# Patient Record
Sex: FEMALE | Race: WHITE | URBAN - METROPOLITAN AREA
[De-identification: names, ages, dates, MRNs, and addresses within clinical notes are randomized per-mention and may not be internally consistent; named-entity substitution may affect disease eponyms.]

---

## 2022-10-20 ENCOUNTER — APPOINTMENT (OUTPATIENT)
Dept: CT IMAGING | Age: 52
DRG: 263 | End: 2022-10-20
Payer: COMMERCIAL

## 2022-10-20 ENCOUNTER — APPOINTMENT (OUTPATIENT)
Dept: GENERAL RADIOLOGY | Age: 52
DRG: 263 | End: 2022-10-20
Payer: COMMERCIAL

## 2022-10-20 ENCOUNTER — HOSPITAL ENCOUNTER (INPATIENT)
Age: 52
LOS: 2 days | Discharge: HOME | DRG: 263 | End: 2022-10-23
Attending: INTERNAL MEDICINE | Admitting: INTERNAL MEDICINE
Payer: COMMERCIAL

## 2022-10-20 DIAGNOSIS — R74.8 ELEVATED LIPASE: ICD-10-CM

## 2022-10-20 DIAGNOSIS — K85.10 ACUTE GALLSTONE PANCREATITIS: ICD-10-CM

## 2022-10-20 DIAGNOSIS — K80.20 SYMPTOMATIC CHOLELITHIASIS: Primary | ICD-10-CM

## 2022-10-20 DIAGNOSIS — K85.10 GALLSTONE PANCREATITIS: ICD-10-CM

## 2022-10-20 LAB
A/G RATIO: 1.8 (ref 1.1–2.2)
ALBUMIN SERPL-MCNC: 3.9 G/DL (ref 3.4–5)
ALP BLD-CCNC: 57 U/L (ref 40–129)
ALT SERPL-CCNC: 16 U/L (ref 10–40)
ANION GAP SERPL CALCULATED.3IONS-SCNC: 9 MMOL/L (ref 3–16)
AST SERPL-CCNC: 17 U/L (ref 15–37)
BACTERIA: ABNORMAL /HPF
BASOPHILS ABSOLUTE: 0.1 K/UL (ref 0–0.2)
BASOPHILS RELATIVE PERCENT: 0.7 %
BILIRUB SERPL-MCNC: 0.3 MG/DL (ref 0–1)
BILIRUBIN URINE: NEGATIVE
BLOOD, URINE: NEGATIVE
BUN BLDV-MCNC: 9 MG/DL (ref 7–20)
CALCIUM SERPL-MCNC: 9.4 MG/DL (ref 8.3–10.6)
CHLORIDE BLD-SCNC: 103 MMOL/L (ref 99–110)
CLARITY: CLEAR
CO2: 27 MMOL/L (ref 21–32)
COLOR: YELLOW
CREAT SERPL-MCNC: 0.7 MG/DL (ref 0.6–1.1)
EOSINOPHILS ABSOLUTE: 0.3 K/UL (ref 0–0.6)
EOSINOPHILS RELATIVE PERCENT: 3.4 %
EPITHELIAL CELLS, UA: 1 /HPF (ref 0–5)
GFR SERPL CREATININE-BSD FRML MDRD: >60 ML/MIN/{1.73_M2}
GLUCOSE BLD-MCNC: 102 MG/DL (ref 70–99)
GLUCOSE URINE: NEGATIVE MG/DL
HCT VFR BLD CALC: 41.3 % (ref 36–48)
HEMOGLOBIN: 13.3 G/DL (ref 12–16)
HYALINE CASTS: 1 /LPF (ref 0–8)
KETONES, URINE: ABNORMAL MG/DL
LACTIC ACID, SEPSIS: 0.7 MMOL/L (ref 0.4–1.9)
LEUKOCYTE ESTERASE, URINE: ABNORMAL
LIPASE: 173 U/L (ref 13–60)
LYMPHOCYTES ABSOLUTE: 2.5 K/UL (ref 1–5.1)
LYMPHOCYTES RELATIVE PERCENT: 30.9 %
MCH RBC QN AUTO: 28.7 PG (ref 26–34)
MCHC RBC AUTO-ENTMCNC: 32.3 G/DL (ref 31–36)
MCV RBC AUTO: 88.8 FL (ref 80–100)
MICROSCOPIC EXAMINATION: YES
MONOCYTES ABSOLUTE: 0.6 K/UL (ref 0–1.3)
MONOCYTES RELATIVE PERCENT: 6.9 %
NEUTROPHILS ABSOLUTE: 4.7 K/UL (ref 1.7–7.7)
NEUTROPHILS RELATIVE PERCENT: 58.1 %
NITRITE, URINE: NEGATIVE
PDW BLD-RTO: 14.8 % (ref 12.4–15.4)
PH UA: 6.5 (ref 5–8)
PLATELET # BLD: 237 K/UL (ref 135–450)
PMV BLD AUTO: 8.2 FL (ref 5–10.5)
POTASSIUM SERPL-SCNC: 4.2 MMOL/L (ref 3.5–5.1)
PROTEIN UA: NEGATIVE MG/DL
RBC # BLD: 4.65 M/UL (ref 4–5.2)
RBC UA: 0 /HPF (ref 0–4)
SODIUM BLD-SCNC: 139 MMOL/L (ref 136–145)
SPECIFIC GRAVITY UA: <=1.005 (ref 1–1.03)
TOTAL PROTEIN: 6.1 G/DL (ref 6.4–8.2)
TROPONIN: <0.01 NG/ML
URINE REFLEX TO CULTURE: ABNORMAL
URINE TYPE: ABNORMAL
UROBILINOGEN, URINE: 0.2 E.U./DL
WBC # BLD: 8.1 K/UL (ref 4–11)
WBC UA: 6 /HPF (ref 0–5)

## 2022-10-20 PROCEDURE — 81001 URINALYSIS AUTO W/SCOPE: CPT

## 2022-10-20 PROCEDURE — 6370000000 HC RX 637 (ALT 250 FOR IP): Performed by: PHYSICIAN ASSISTANT

## 2022-10-20 PROCEDURE — 80053 COMPREHEN METABOLIC PANEL: CPT

## 2022-10-20 PROCEDURE — 85025 COMPLETE CBC W/AUTO DIFF WBC: CPT

## 2022-10-20 PROCEDURE — 71046 X-RAY EXAM CHEST 2 VIEWS: CPT

## 2022-10-20 PROCEDURE — 93005 ELECTROCARDIOGRAM TRACING: CPT | Performed by: PHYSICIAN ASSISTANT

## 2022-10-20 PROCEDURE — 83605 ASSAY OF LACTIC ACID: CPT

## 2022-10-20 PROCEDURE — 36415 COLL VENOUS BLD VENIPUNCTURE: CPT

## 2022-10-20 PROCEDURE — 83690 ASSAY OF LIPASE: CPT

## 2022-10-20 PROCEDURE — 99285 EMERGENCY DEPT VISIT HI MDM: CPT

## 2022-10-20 PROCEDURE — 84484 ASSAY OF TROPONIN QUANT: CPT

## 2022-10-20 RX ORDER — ONDANSETRON 4 MG/1
4 TABLET, ORALLY DISINTEGRATING ORAL ONCE
Status: COMPLETED | OUTPATIENT
Start: 2022-10-20 | End: 2022-10-20

## 2022-10-20 RX ORDER — FAMOTIDINE 20 MG/1
20 TABLET, FILM COATED ORAL ONCE
Status: COMPLETED | OUTPATIENT
Start: 2022-10-20 | End: 2022-10-20

## 2022-10-20 RX ORDER — HYDROCODONE BITARTRATE AND ACETAMINOPHEN 5; 325 MG/1; MG/1
2 TABLET ORAL ONCE
Status: COMPLETED | OUTPATIENT
Start: 2022-10-20 | End: 2022-10-20

## 2022-10-20 RX ADMIN — FAMOTIDINE 20 MG: 20 TABLET, FILM COATED ORAL at 23:17

## 2022-10-20 RX ADMIN — HYDROCODONE BITARTRATE AND ACETAMINOPHEN 2 TABLET: 5; 325 TABLET ORAL at 23:16

## 2022-10-20 RX ADMIN — LIDOCAINE HYDROCHLORIDE: 20 SOLUTION ORAL; TOPICAL at 23:18

## 2022-10-20 RX ADMIN — ONDANSETRON 4 MG: 4 TABLET, ORALLY DISINTEGRATING ORAL at 23:17

## 2022-10-20 ASSESSMENT — PAIN DESCRIPTION - ORIENTATION: ORIENTATION: MID

## 2022-10-20 ASSESSMENT — PAIN SCALES - GENERAL
PAINLEVEL_OUTOF10: 9
PAINLEVEL_OUTOF10: 7

## 2022-10-20 ASSESSMENT — PAIN DESCRIPTION - ONSET: ONSET: GRADUAL

## 2022-10-20 ASSESSMENT — PAIN DESCRIPTION - FREQUENCY: FREQUENCY: CONTINUOUS

## 2022-10-20 ASSESSMENT — ENCOUNTER SYMPTOMS
SHORTNESS OF BREATH: 0
COUGH: 0
RHINORRHEA: 0
VOMITING: 0
DIARRHEA: 0
NAUSEA: 1
ABDOMINAL PAIN: 1

## 2022-10-20 ASSESSMENT — PAIN DESCRIPTION - PAIN TYPE: TYPE: ACUTE PAIN

## 2022-10-20 ASSESSMENT — PAIN DESCRIPTION - DESCRIPTORS
DESCRIPTORS: ACHING
DESCRIPTORS: NAGGING

## 2022-10-20 ASSESSMENT — PAIN - FUNCTIONAL ASSESSMENT: PAIN_FUNCTIONAL_ASSESSMENT: 0-10

## 2022-10-20 ASSESSMENT — PAIN DESCRIPTION - LOCATION
LOCATION: ABDOMEN
LOCATION: ABDOMEN

## 2022-10-21 ENCOUNTER — ANESTHESIA EVENT (OUTPATIENT)
Dept: OPERATING ROOM | Age: 52
DRG: 263 | End: 2022-10-21
Payer: COMMERCIAL

## 2022-10-21 ENCOUNTER — APPOINTMENT (OUTPATIENT)
Dept: CT IMAGING | Age: 52
DRG: 263 | End: 2022-10-21
Payer: COMMERCIAL

## 2022-10-21 PROBLEM — R74.8 ELEVATED LIPASE: Status: ACTIVE | Noted: 2022-10-21

## 2022-10-21 PROBLEM — K85.10 GALLSTONE PANCREATITIS: Status: ACTIVE | Noted: 2022-10-21

## 2022-10-21 PROBLEM — K80.20 SYMPTOMATIC CHOLELITHIASIS: Status: ACTIVE | Noted: 2022-10-21

## 2022-10-21 PROBLEM — K85.10 ACUTE GALLSTONE PANCREATITIS: Status: ACTIVE | Noted: 2022-10-21

## 2022-10-21 LAB
EKG ATRIAL RATE: 61 BPM
EKG DIAGNOSIS: NORMAL
EKG P AXIS: 107 DEGREES
EKG P-R INTERVAL: 102 MS
EKG Q-T INTERVAL: 386 MS
EKG QRS DURATION: 86 MS
EKG QTC CALCULATION (BAZETT): 388 MS
EKG R AXIS: 38 DEGREES
EKG T AXIS: 44 DEGREES
EKG VENTRICULAR RATE: 61 BPM

## 2022-10-21 PROCEDURE — 74177 CT ABD & PELVIS W/CONTRAST: CPT

## 2022-10-21 PROCEDURE — 93010 ELECTROCARDIOGRAM REPORT: CPT | Performed by: INTERNAL MEDICINE

## 2022-10-21 PROCEDURE — 6360000002 HC RX W HCPCS: Performed by: INTERNAL MEDICINE

## 2022-10-21 PROCEDURE — 1200000000 HC SEMI PRIVATE

## 2022-10-21 PROCEDURE — 6360000002 HC RX W HCPCS: Performed by: SURGERY

## 2022-10-21 PROCEDURE — APPNB30 APP NON BILLABLE TIME 0-30 MINS: Performed by: NURSE PRACTITIONER

## 2022-10-21 PROCEDURE — 99222 1ST HOSP IP/OBS MODERATE 55: CPT | Performed by: SURGERY

## 2022-10-21 PROCEDURE — 6360000004 HC RX CONTRAST MEDICATION: Performed by: PHYSICIAN ASSISTANT

## 2022-10-21 PROCEDURE — 6370000000 HC RX 637 (ALT 250 FOR IP): Performed by: INTERNAL MEDICINE

## 2022-10-21 PROCEDURE — 2580000003 HC RX 258: Performed by: INTERNAL MEDICINE

## 2022-10-21 PROCEDURE — C9113 INJ PANTOPRAZOLE SODIUM, VIA: HCPCS | Performed by: SURGERY

## 2022-10-21 RX ORDER — HYDROMORPHONE HYDROCHLORIDE 1 MG/ML
0.5 INJECTION, SOLUTION INTRAMUSCULAR; INTRAVENOUS; SUBCUTANEOUS EVERY 4 HOURS PRN
Status: DISCONTINUED | OUTPATIENT
Start: 2022-10-21 | End: 2022-10-23 | Stop reason: HOSPADM

## 2022-10-21 RX ORDER — ENOXAPARIN SODIUM 100 MG/ML
40 INJECTION SUBCUTANEOUS DAILY
Status: DISCONTINUED | OUTPATIENT
Start: 2022-10-21 | End: 2022-10-23 | Stop reason: HOSPADM

## 2022-10-21 RX ORDER — SODIUM CHLORIDE 0.9 % (FLUSH) 0.9 %
5-40 SYRINGE (ML) INJECTION EVERY 12 HOURS SCHEDULED
Status: DISCONTINUED | OUTPATIENT
Start: 2022-10-21 | End: 2022-10-23 | Stop reason: HOSPADM

## 2022-10-21 RX ORDER — HYDRALAZINE HYDROCHLORIDE 20 MG/ML
10 INJECTION INTRAMUSCULAR; INTRAVENOUS EVERY 4 HOURS PRN
Status: DISCONTINUED | OUTPATIENT
Start: 2022-10-21 | End: 2022-10-23 | Stop reason: HOSPADM

## 2022-10-21 RX ORDER — ACETAMINOPHEN 325 MG/1
650 TABLET ORAL EVERY 4 HOURS PRN
Status: DISCONTINUED | OUTPATIENT
Start: 2022-10-21 | End: 2022-10-23 | Stop reason: HOSPADM

## 2022-10-21 RX ORDER — ONDANSETRON 4 MG/1
4 TABLET, ORALLY DISINTEGRATING ORAL EVERY 8 HOURS PRN
Status: DISCONTINUED | OUTPATIENT
Start: 2022-10-21 | End: 2022-10-23 | Stop reason: HOSPADM

## 2022-10-21 RX ORDER — PANTOPRAZOLE SODIUM 40 MG/10ML
40 INJECTION, POWDER, LYOPHILIZED, FOR SOLUTION INTRAVENOUS 2 TIMES DAILY
Status: DISCONTINUED | OUTPATIENT
Start: 2022-10-21 | End: 2022-10-23 | Stop reason: HOSPADM

## 2022-10-21 RX ORDER — SODIUM CHLORIDE, SODIUM LACTATE, POTASSIUM CHLORIDE, CALCIUM CHLORIDE 600; 310; 30; 20 MG/100ML; MG/100ML; MG/100ML; MG/100ML
INJECTION, SOLUTION INTRAVENOUS CONTINUOUS
Status: ACTIVE | OUTPATIENT
Start: 2022-10-21 | End: 2022-10-21

## 2022-10-21 RX ORDER — ONDANSETRON 2 MG/ML
4 INJECTION INTRAMUSCULAR; INTRAVENOUS EVERY 6 HOURS PRN
Status: DISCONTINUED | OUTPATIENT
Start: 2022-10-21 | End: 2022-10-23 | Stop reason: HOSPADM

## 2022-10-21 RX ORDER — 0.9 % SODIUM CHLORIDE 0.9 %
1000 INTRAVENOUS SOLUTION INTRAVENOUS ONCE
Status: DISCONTINUED | OUTPATIENT
Start: 2022-10-21 | End: 2022-10-23 | Stop reason: HOSPADM

## 2022-10-21 RX ORDER — SODIUM CHLORIDE, SODIUM LACTATE, POTASSIUM CHLORIDE, CALCIUM CHLORIDE 600; 310; 30; 20 MG/100ML; MG/100ML; MG/100ML; MG/100ML
INJECTION, SOLUTION INTRAVENOUS CONTINUOUS
Status: DISCONTINUED | OUTPATIENT
Start: 2022-10-21 | End: 2022-10-23 | Stop reason: HOSPADM

## 2022-10-21 RX ORDER — SODIUM CHLORIDE 9 MG/ML
INJECTION, SOLUTION INTRAVENOUS PRN
Status: DISCONTINUED | OUTPATIENT
Start: 2022-10-21 | End: 2022-10-23 | Stop reason: HOSPADM

## 2022-10-21 RX ORDER — SODIUM CHLORIDE 0.9 % (FLUSH) 0.9 %
5-40 SYRINGE (ML) INJECTION PRN
Status: DISCONTINUED | OUTPATIENT
Start: 2022-10-21 | End: 2022-10-23 | Stop reason: HOSPADM

## 2022-10-21 RX ORDER — MORPHINE SULFATE 4 MG/ML
4 INJECTION, SOLUTION INTRAMUSCULAR; INTRAVENOUS EVERY 30 MIN PRN
Status: DISCONTINUED | OUTPATIENT
Start: 2022-10-21 | End: 2022-10-23 | Stop reason: HOSPADM

## 2022-10-21 RX ADMIN — ENOXAPARIN SODIUM 40 MG: 100 INJECTION SUBCUTANEOUS at 07:30

## 2022-10-21 RX ADMIN — PANTOPRAZOLE SODIUM 40 MG: 40 INJECTION, POWDER, FOR SOLUTION INTRAVENOUS at 20:19

## 2022-10-21 RX ADMIN — ACETAMINOPHEN 650 MG: 325 TABLET ORAL at 20:19

## 2022-10-21 RX ADMIN — SODIUM CHLORIDE, POTASSIUM CHLORIDE, SODIUM LACTATE AND CALCIUM CHLORIDE: 600; 310; 30; 20 INJECTION, SOLUTION INTRAVENOUS at 04:59

## 2022-10-21 RX ADMIN — IOPAMIDOL 75 ML: 755 INJECTION, SOLUTION INTRAVENOUS at 00:16

## 2022-10-21 RX ADMIN — PANTOPRAZOLE SODIUM 40 MG: 40 INJECTION, POWDER, FOR SOLUTION INTRAVENOUS at 10:23

## 2022-10-21 RX ADMIN — SODIUM CHLORIDE, POTASSIUM CHLORIDE, SODIUM LACTATE AND CALCIUM CHLORIDE: 600; 310; 30; 20 INJECTION, SOLUTION INTRAVENOUS at 20:21

## 2022-10-21 RX ADMIN — SODIUM CHLORIDE, PRESERVATIVE FREE 10 ML: 5 INJECTION INTRAVENOUS at 20:19

## 2022-10-21 ASSESSMENT — PAIN SCALES - GENERAL
PAINLEVEL_OUTOF10: 8
PAINLEVEL_OUTOF10: 0
PAINLEVEL_OUTOF10: 0

## 2022-10-21 ASSESSMENT — PAIN DESCRIPTION - LOCATION: LOCATION: HEAD

## 2022-10-21 NOTE — ANESTHESIA PRE PROCEDURE
Department of Anesthesiology  Preprocedure Note       Name:  Arash Culver   Age:  45 y.o.  :  1970                                          MRN:  [id number]         Date:  10/21/2022      Surgeon: Christopher Jara):  Marjorie Peterson MD    Procedure: Procedure(s):  LAPAROSCOPIC CHOLECYSTECTOMY WITH CHOLANGIOGRAM    Medications prior to admission:   Prior to Admission medications    Medication Sig Start Date End Date Taking? Authorizing Provider   naproxen (NAPROSYN) 500 MG tablet Take 1 tablet by mouth 2 times daily (with meals) for 30 doses.  7/31/14 8/15/14  VITO Jara       Current medications:    Current Facility-Administered Medications   Medication Dose Route Frequency Provider Last Rate Last Admin    0.9 % sodium chloride bolus  1,000 mL IntraVENous Once Oracio Srivastava MD        morphine injection 4 mg  4 mg IntraVENous Q30 Min PRN Oracio Srivastava MD        sodium chloride flush 0.9 % injection 5-40 mL  5-40 mL IntraVENous 2 times per day Oracio Srivastava MD        sodium chloride flush 0.9 % injection 5-40 mL  5-40 mL IntraVENous PRN Oracio Srivastava MD        0.9 % sodium chloride infusion   IntraVENous PRN Oracio Srivastava MD        ondansetron (ZOFRAN-ODT) disintegrating tablet 4 mg  4 mg Oral Q8H PRN Oracio Srivastava MD        Or    [facility]ansetron [facility]) injection 4 mg  4 mg IntraVENous Q6H PRN Oracio Srivastava MD        enoxaparin (LOVENOX) injection 40 mg  40 mg SubCUTAneous Daily Oracio Srivastava MD   40 mg at 10/21/22 0730    lactated ringers infusion   IntraVENous Continuous Oracio Srivastava  mL/hr at 10/21/22 0459 New Bag at 10/21/22 0459    Followed by   Idella Hard lactated ringers infusion   IntraVENous Continuous Oracio Srivastava MD        HYDROmorphone HCl PF (DILAUDID) injection 0.5 mg  0.5 mg IntraVENous Q4H PRN Oracio Srivastava MD           Allergies:  No Known Allergies    Problem List:    Patient Active Problem List   Diagnosis Code    Acute gallstone pancreatitis K85.10       Past Medical History:  History reviewed. No pertinent past medical history. Past Surgical History:  History reviewed. No pertinent surgical history. Social History:    Social History     Tobacco Use    Smoking status: Never    Smokeless tobacco: Never   Substance Use Topics    Alcohol use: Yes                                Counseling given: Not Answered      Vital Signs (Current):   Vitals:    10/21/22 0350 10/21/22 0400 10/21/22 0437 10/21/22 0715   BP:  137/82 (!) 154/93 126/80   Pulse:   88 63   Resp:   18 16   Temp:   36.6 °C (97.9 °F) 36.7 °C (98 °F)   TempSrc:   Oral Oral   SpO2: 98% 96% 97% 97%   Weight:       Height:                                                  BP Readings from Last 3 Encounters:   10/21/22 126/80       NPO Status:                                                                                 BMI:   Wt Readings from Last 3 Encounters:   10/20/22 222 lb (100.7 kg)     Body mass index is 39.33 kg/m². CBC:   Lab Results   Component Value Date/Time    WBC 8.1 10/20/2022 11:25 PM    RBC 4.65 10/20/2022 11:25 PM    HGB 13.3 10/20/2022 11:25 PM    HCT 41.3 10/20/2022 11:25 PM    MCV 88.8 10/20/2022 11:25 PM    RDW 14.8 10/20/2022 11:25 PM     10/20/2022 11:25 PM       CMP:   Lab Results   Component Value Date/Time     10/20/2022 11:25 PM    K 4.2 10/20/2022 11:25 PM     10/20/2022 11:25 PM    CO2 27 10/20/2022 11:25 PM    BUN 9 10/20/2022 11:25 PM    CREATININE 0.7 10/20/2022 11:25 PM    AGRATIO 1.8 10/20/2022 11:25 PM    LABGLOM >60 10/20/2022 11:25 PM    GLUCOSE 102 10/20/2022 11:25 PM    PROT 6.1 10/20/2022 11:25 PM    CALCIUM 9.4 10/20/2022 11:25 PM    BILITOT 0.3 10/20/2022 11:25 PM    ALKPHOS 57 10/20/2022 11:25 PM    AST 17 10/20/2022 11:25 PM    ALT 16 10/20/2022 11:25 PM       POC Tests: No results for input(s): POCGLU, POCNA, POCK, POCCL, POCBUN, POCHEMO, POCHCT in the last 72 hours.     Coags: No results found for: PROTIME, INR, APTT    HCG (If Applicable): No results found for: PREGTESTUR, PREGSERUM, HCG, HCGQUANT     ABGs: No results found for: PHART, PO2ART, AIY1BVR, TUH4CFN, BEART, G8CUCIWX     Type & Screen (If Applicable):  No results found for: LABABO, LABRH    Drug/Infectious Status (If Applicable):  No results found for: HIV, HEPCAB    COVID-19 Screening (If Applicable): No results found for: COVID19        Anesthesia Evaluation  Patient summary reviewed and Nursing notes reviewed no history of anesthetic complications:   Airway: Mallampati: II          Dental:          Pulmonary:                              Cardiovascular:          ECG reviewed                        Neuro/Psych:               GI/Hepatic/Renal:             Endo/Other:                     Abdominal:             Vascular: Other Findings:           Anesthesia Plan      general     ASA 2       Induction: intravenous. MIPS: Postoperative opioids intended, Prophylactic antiemetics administered and Postoperative trial extubation. Anesthetic plan and risks discussed with patient. Plan discussed with attending.                     MEET Reyes - URBANO   10/21/2022

## 2022-10-21 NOTE — ED PROVIDER NOTES
[facility]        Pt Name: Van Mullen  MRN: [id number]  [place] 1970  Date of evaluation: 10/20/2022  Provider: Ajit Shelley PA-C  PCP: No primary care provider on file. Note Started: 10:50 PM EDT       LILI. I have evaluated this patient. My supervising physician was available for consultation. CHIEF COMPLAINT       Chief Complaint   Patient presents with    Abdominal Pain     Pt states abd pain on and off for 3 days, denies N/V/D       HISTORY OF PRESENT ILLNESS   (Location, Timing/Onset, Context/Setting, Quality, Duration, Modifying Factors, Severity, Associated Signs and Symptoms)  Note limiting factors. Chief Complaint: Abdominal pain    Van uMllen is a 45 y.o. female who presents to the emergency department today for evaluation for abdominal pain. The patient states that for the past 3 days she has been experiencing pain to her epigastric abdomen, which she describes as a \"stomachache\". The patient states that her pain has been worsening over the past 2 days. She is nauseous, and has had several episodes of emesis. She denies any bilious emesis, hematemesis or coffee-ground emesis. She states that she denies any chest pain but she states that the pain radiates up into her chest.  She states that her pain is burning, and does have some indigestion. Patient denies feeling short of breath. She denies any diarrhea. She has not any fever or chills. No cough or congestion. He denies any dysuria or hematuria. Patient denies any past surgical history to the abdomen, no other complaints or symptoms    Nursing Notes were all reviewed and agreed with or any disagreements were addressed in the HPI. REVIEW OF SYSTEMS    (2-9 systems for level 4, 10 or more for level 5)     Review of Systems   Constitutional:  Negative for activity change, appetite change, chills and fever.    HENT:  Negative for congestion and rhinorrhea. Respiratory:  Negative for cough and shortness of breath. Cardiovascular:  Negative for chest pain. Gastrointestinal:  Positive for abdominal pain and nausea. Negative for diarrhea and vomiting. Genitourinary:  Negative for difficulty urinating, dysuria and hematuria. Positives and Pertinent negatives as per HPI. Except as noted above in the ROS, all other systems were reviewed and negative. PAST MEDICAL HISTORY   History reviewed. No pertinent past medical history. SURGICAL HISTORY   History reviewed. No pertinent surgical history. CURRENTMEDICATIONS       Previous Medications    NAPROXEN (NAPROSYN) 500 MG TABLET    Take 1 tablet by mouth 2 times daily (with meals) for 30 doses. ALLERGIES     Patient has no known allergies. FAMILYHISTORY     History reviewed. No pertinent family history. SOCIAL HISTORY       Social History     Tobacco Use    Smoking status: Never    Smokeless tobacco: Never   Substance Use Topics    Alcohol use: Yes    Drug use: No       SCREENINGS             PHYSICAL EXAM    (up to 7 for level 4, 8 or more for level 5)     ED Triage Vitals [10/20/22 2129]   BP Temp Temp Source Heart Rate Resp SpO2 Height Weight   (!) 159/95 98 °F (36.7 °C) Oral 66 16 97 % 5' 3\" (1.6 m) 222 lb (100.7 kg)       Physical Exam  Vitals and nursing note reviewed. Constitutional:       Appearance: She is well-developed. She is not diaphoretic. HENT:      Head: Normocephalic and atraumatic. Right Ear: External ear normal.      Left Ear: External ear normal.      Nose: Nose normal.   Eyes:      General:         Right eye: No discharge. Left eye: No discharge. Neck:      Trachea: No tracheal deviation. Cardiovascular:      Rate and Rhythm: Normal rate and regular rhythm. Heart sounds: No murmur heard. Pulmonary:      Effort: Pulmonary effort is normal. No respiratory distress. Breath sounds: Normal breath sounds.  No wheezing or rales.   Abdominal:      General: Bowel sounds are normal. There is no distension. Palpations: Abdomen is soft. Tenderness: There is abdominal tenderness in the right upper quadrant and epigastric area. There is no guarding or rebound. Musculoskeletal:         General: Normal range of motion. Cervical back: Normal range of motion and neck supple. Skin:     General: Skin is warm and dry. Neurological:      Mental Status: She is alert and oriented to person, place, and time. Psychiatric:         Behavior: Behavior normal.       DIAGNOSTIC RESULTS   LABS:    Labs Reviewed   COMPREHENSIVE METABOLIC PANEL - Abnormal; Notable for the following components:       Result Value    Glucose 102 (*)     Total Protein 6.1 (*)     All other components within normal limits   LIPASE - Abnormal; Notable for the following components:    Lipase 173.0 (*)     All other components within normal limits   URINALYSIS WITH REFLEX TO CULTURE - Abnormal; Notable for the following components:    Ketones, Urine TRACE (*)     Leukocyte Esterase, Urine SMALL (*)     All other components within normal limits   MICROSCOPIC URINALYSIS - Abnormal; Notable for the following components:    WBC, UA 6 (*)     All other components within normal limits   CBC WITH AUTO DIFFERENTIAL   TROPONIN   LACTATE, SEPSIS   LACTATE, SEPSIS       When ordered only abnormal lab results are displayed. All other labs were within normal range or not returned as of this dictation. EKG: When ordered, EKG's are interpreted by the Emergency Department Physician in the absence of a cardiologist.  Please see their note for interpretation of EKG.     RADIOLOGY:   Non-plain film images such as CT, Ultrasound and MRI are read by the radiologist. Plain radiographic images are visualized and preliminarily interpreted by the ED Provider with the below findings:        Interpretation per the Radiologist below, if available at the time of this note:    CT ABDOMEN PELVIS W IV CONTRAST Additional Contrast? None   Final Result   1. Edematous appearance of the duodenum may represent duodenitis. 2. Cholelithiasis with presence of a 2.5 cm stone. No CT evidence of acute   cholecystitis. 3. Few scattered liver hypodensities measuring up to 1.3 cm of unclear   etiology. Can follow-up with non-emergent liver protocol imaging. XR CHEST (2 VW)   Final Result   No radiographic evidence of an acute cardiopulmonary process. No results found. PROCEDURES   Unless otherwise noted below, none     Procedures    CRITICAL CARE TIME       CONSULTS:  None      EMERGENCY DEPARTMENT COURSE and DIFFERENTIAL DIAGNOSIS/MDM:   Vitals:    Vitals:    10/20/22 2129   BP: (!) 159/95   Pulse: 66   Resp: 16   Temp: 98 °F (36.7 °C)   TempSrc: Oral   SpO2: 97%   Weight: 222 lb (100.7 kg)   Height: 5' 3\" (1.6 m)       Patient was given the following medications:  Medications   0.9 % sodium chloride bolus (has no administration in time range)   morphine injection 4 mg (has no administration in time range)   aluminum & magnesium hydroxide-simethicone (MAALOX) 30 mL, lidocaine viscous hcl (XYLOCAINE) 5 mL (GI COCKTAIL) ( Oral Given 10/20/22 2318)   famotidine (PEPCID) tablet 20 mg (20 mg Oral Given 10/20/22 2317)   HYDROcodone-acetaminophen (NORCO) 5-325 MG per tablet 2 tablet (2 tablets Oral Given 10/20/22 2316)   ondansetron (ZOFRAN-ODT) disintegrating tablet 4 mg (4 mg Oral Given 10/20/22 2317)   iopamidol (ISOVUE-370) 76 % injection 75 mL (75 mLs IntraVENous Given 10/21/22 0016)         Is this patient to be included in the SEP-1 Core Measure due to severe sepsis or septic shock? No   Exclusion criteria - the patient is NOT to be included for SEP-1 Core Measure due to: Infection is not suspected    Briefly, this is a 61-year-old female who presents to the emergency department today for evaluation for abdominal pain.   Patient has been experiencing epigastric abdominal pain for the past 3 days, has been constant. Denies any isolated chest pain, but feels that the pain will radiate up into her chest.  She has no fever or chills. No cough. No other complaints or symptoms per    On exam, she does have tenderness to her right upper quadrant epigastric abdomen. EKG will be documented by my attending, please see his note for further details. CBC shows no evidence of leukocytosis. Her CMP shows no hyperbilirubinemia, there is no transaminitis. Lipase is minimally elevated. Troponin negative lactic acid is normal.    CT does show edematous appearance of the duodenum possible duodenitis. She does have cholelithiasis with a presence of a 2.5 cm stone. There is no acute cholecystitis. Due to the patient's symptomatic cholelithiasis, as well as elevated lipase, I feel that she will need to be admitted for further evaluation, discussed the case with general surgery, they are planning to see in a.m. Hospitalist has been paged for admission, the patient is updated, we will plan. Stable for admission. She will be given IV fluids, morphine, as well as Zofran. Otherwise does not require any antibiotics that she does not have acute cholecystitis she has no white blood cell count, she is afebrile there is no lactic acidosis. FINAL IMPRESSION      1. Symptomatic cholelithiasis    2. Elevated lipase          DISPOSITION/PLAN   DISPOSITION Decision To Admit 10/21/2022 12:58:53 AM      PATIENT REFERRED TO:  No follow-up provider specified.     DISCHARGE MEDICATIONS:  New Prescriptions    No medications on file       DISCONTINUED MEDICATIONS:  Discontinued Medications    No medications on file              (Please note that portions of this note were completed with a voice recognition program.  Efforts were made to edit the dictations but occasionally words are mis-transcribed.)    Alina العلي PA-C (electronically signed)            Alina العلي PA-C  10/21/22 1201

## 2022-10-21 NOTE — PROGRESS NOTES
AM assessments completed. VSS. Morning meds given, well tolerated. Alert and oriented x4. Helped pt to the bathroom. The care plan and education has been reviewed and mutually agreed upon with the patient.

## 2022-10-21 NOTE — PROGRESS NOTES
Pt admitted on to unit, LOCx4, room air, ambulates independently, pain and nausea controlled.  Call light in reach, comfortable and ready for rest

## 2022-10-21 NOTE — ACP (ADVANCE CARE PLANNING)
Advanced Care Planning Note. Purpose of Encounter: Advanced care planning in light of acute gallstone pancreatitis  Parties In Attendance: Patient  Decisional Capacity: Yes  Subjective: Patient with RUQ pain  Objective: Cr 0.7 on 10/20  Goals of Care Determination: Patient wants full support (CPR, vent, surgery, HD, trach, PEG)  Plan:  IVF, Morphine IV PRN, Surgery consult  Code Status: Full code   Time spent on Advanced care Plannin minutes  Advanced Care Planning Documents: Completed advanced directives on chart,  is the POA.     John Kearney MD  10/21/2022 1:49 PM

## 2022-10-21 NOTE — CARE COORDINATION
Discharge Planning Note:    Chart reviewed and it appears that patient has minimal needs for discharge at this time. Discussed with patient and requested that case management be notified if discharge needs are identified.     - Current discharge plan is for the patient to return home. - PCP: [facility]     Case management will continue to follow progress and update discharge plan as needed.       Risk of Readmission Score: 4%    PRUDENCE Ortiz RN    [facility]  Phone: [phone]

## 2022-10-21 NOTE — PROGRESS NOTES
Nutrition Note    RECOMMENDATIONS  PO Diet: NPO  Weight: obtain actual weight      NUTRITION ASSESSMENT   Pt admitted with c/o abdominal pain and nausea. Nutrition evaluation triggered based on admission assessment of 3, indicating wt loss and poor po intake. CBW is stated at 222 lb and no hx in chart. Pt NPO at this time and unavailable during RD visit, will follow for diet adv with tolerance a provide appropriate nutrition. Nutrition Related Findings: active BS; LR at 150 mL/hr  Wounds: None  Nutrition Education:  Education not indicated   Nutrition Goals: Initiate PO diet     MALNUTRITION ASSESSMENT      Malnutrition Status: No malnutrition      NUTRITION DIAGNOSIS   Inadequate oral intake related to altered GI function as evidenced by NPO or clear liquid status due to medical condition      CURRENT NUTRITION THERAPIES  Diet NPO Exceptions are: Ice Chips     PO Intake: NPO   PO Supplement Intake:NPO      ANTHROPOMETRICS  Current Height: 5' 3\" (160 cm)  Current Weight: 222 lb (100.7 kg)  - stated  Ideal Body Weight (IBW): 115 lbs  (52 kg)          BMI: 39.3        The patient will be monitored per nutrition standards of care. Consult dietitian if additional nutrition interventions are needed prior to RD reassessment.      Marlon Ann RD, LD    Contact: [phone]

## 2022-10-21 NOTE — CONSULTS
[facility]        PATIENT NAME: Mikel Claude      TODAY'S DATE: 10/21/2022    Reason for Consult:  Abdominal pain    Requesting Physician:  Dr. Walton Books:              The patient is a 45 y.o. female who presents with abdominal pain, sharp, epigastric and RUQ, severe, over 3 days, more with pressure. The pt has had symptoms only this occurrence, no prior similar issues. She has had associated symptoms of nausea, no emesis. Testing has included CT scan. Past Medical History:    History reviewed. No pertinent past medical history. Past Surgical History:    C sec  No prior upper abd surgery  Had a colonoscopy many years ago    Current Medications:   Current Facility-Administered Medications: 0.9 % sodium chloride bolus, 1,000 mL, IntraVENous, Once  morphine injection 4 mg, 4 mg, IntraVENous, Q30 Min PRN  sodium chloride flush 0.9 % injection 5-40 mL, 5-40 mL, IntraVENous, 2 times per day  sodium chloride flush 0.9 % injection 5-40 mL, 5-40 mL, IntraVENous, PRN  0.9 % sodium chloride infusion, , IntraVENous, PRN  ondansetron (ZOFRAN-ODT) disintegrating tablet 4 mg, 4 mg, Oral, Q8H PRN **OR** ondansetron (ZOFRAN) injection 4 mg, 4 mg, IntraVENous, Q6H PRN  enoxaparin (LOVENOX) injection 40 mg, 40 mg, SubCUTAneous, Daily  lactated ringers infusion, , IntraVENous, Continuous **FOLLOWED BY** lactated ringers infusion, , IntraVENous, Continuous  HYDROmorphone HCl PF (DILAUDID) injection 0.5 mg, 0.5 mg, IntraVENous, Q4H PRN  pantoprazole (PROTONIX) injection 40 mg, 40 mg, IntraVENous, BID  Prior to Admission medications    Medication Sig Start Date End Date Taking? Authorizing Provider   naproxen (NAPROSYN) 500 MG tablet Take 1 tablet by mouth 2 times daily (with meals) for 30 doses. 7/31/14 8/15/14  VITO Leos        Allergies:  Patient has no known allergies. Social History:    reports that she has never smoked.  She has never used smokeless tobacco. She reports current alcohol use. She reports that she does not use drugs. Family History:    History reviewed. No pertinent family history. REVIEW OF SYSTEMS:  CONSTITUTIONAL:  negative  HEENT:  negative  RESPIRATORY:  negative  CARDIOVASCULAR:  negative  GASTROINTESTINAL:  negative except for nausea and abdominal pain  GENITOURINARY:  negative  HEMATOLOGIC/LYMPHATIC:  negative  NEUROLOGICAL:  negative  SKIN: negative    PHYSICAL EXAM:  VITALS:  /80   Pulse 63   Temp 98 °F (36.7 °C) (Oral)   Resp 16   Ht 5' 3\" (1.6 m)   Wt 222 lb (100.7 kg)   LMP 06/25/2014   SpO2 97%   BMI 39.33 kg/m²     CONSTITUTIONAL:  alert, no apparent distress and moderately obese  EYES:  sclera clear  ENT:  normocepalic, without obvious abnormality  NECK:  supple, symmetrical, trachea midline  LUNGS:  clear to auscultation  CARDIOVASCULAR:  regular rate and rhythm  ABDOMEN:  scars noted infraumbilical, normal bowel sounds, soft, non-distended, tenderness noted mildly in the epigastric region, voluntary guarding absent, no masses palpated, no hepatosplenomegally and hernia absent  MUSCULOSKELETAL:  0+ pitting edema lower extremities  NEUROLOGIC:  Mental Status Exam:  Level of Alertness:   awake  Orientation:   person, place, time  SKIN:  no bruising or bleeding, normal skin color, texture, turgor, and no redness, warmth, or swelling    DATA:    CBC:   Recent Labs     10/20/22  2325   WBC 8.1   HGB 13.3   HCT 41.3        BMP:    Recent Labs     10/20/22  2325      K 4.2      CO2 27   BUN 9   CREATININE 0.7   GLUCOSE 102*     Hepatic:   Recent Labs     10/20/22  2325   AST 17   ALT 16   BILITOT 0.3   ALKPHOS 57     Mag:    No results for input(s): MG in the last 72 hours. Phos:   No results for input(s): PHOS in the last 72 hours. INR: No results for input(s): INR in the last 72 hours.   LIPASE:   Recent Labs     10/20/22  2325   LIPASE 173.0*      AMYLASE: No results for input(s): AMYLASE in the last 72 hours. XR CHEST (2 VW)    Result Date: 10/20/2022  EXAMINATION: TWO XRAY VIEWS OF THE CHEST 10/20/2022 10:45 pm COMPARISON: None. HISTORY: ORDERING SYSTEM PROVIDED HISTORY: Chest Discomfort TECHNOLOGIST PROVIDED HISTORY: Reason for exam:->Chest Discomfort Reason for Exam: chest pain FINDINGS: Cardiomediastinal silhouette appears within normal limits. No focal consolidation or overt pulmonary edema. Costophrenic angles are sharp. No evidence of pneumothorax. No acute osseous abnormalities. No radiographic evidence of an acute cardiopulmonary process. CT ABDOMEN PELVIS W IV CONTRAST Additional Contrast? None    Result Date: 10/21/2022  EXAMINATION: CT OF THE ABDOMEN AND PELVIS WITH CONTRAST 10/21/2022 12:07 am TECHNIQUE: CT of the abdomen and pelvis was performed with the administration of intravenous contrast. Multiplanar reformatted images are provided for review. Automated exposure control, iterative reconstruction, and/or weight based adjustment of the mA/kV was utilized to reduce the radiation dose to as low as reasonably achievable. COMPARISON: None. HISTORY: ORDERING SYSTEM PROVIDED HISTORY: RUQ Pain TECHNOLOGIST PROVIDED HISTORY: Reason for exam:->RUQ Pain Additional Contrast?->None Decision Support Exception - unselect if not a suspected or confirmed emergency medical condition->Emergency Medical Condition (MA) Reason for Exam: RUQ Pain Relevant Medical/Surgical History: Abdominal Pain (Pt states abd pain on and off for 3 days, denies N/V/D) FINDINGS: Lower Chest: The lung bases are clear. No cardiomegaly. Organs: There is no acute abnormality of the liver, pancreas, spleen, adrenals, or kidneys. Cholelithiasis with presence of a 2.5 cm stone. Few scattered liver hypodensities measuring up to 1.3 cm of unclear etiology. Right renal cyst. GI/Bowel: Bowel caliber is normal.  Edematous appearance of the duodenum. There is no evidence of acute appendicitis.  Pelvis: No acute abnormality of the pelvic viscera. Peritoneum/Retroperitoneum: There is no free air or free fluid. Lymph nodes are not enlarged. Bones/Soft Tissues: No acute abnormality. Multilevel lower lumbar spondylosis. 1. Edematous appearance of the duodenum may represent duodenitis. 2. Cholelithiasis with presence of a 2.5 cm stone. No CT evidence of acute cholecystitis. 3. Few scattered liver hypodensities measuring up to 1.3 cm of unclear etiology. Can follow-up with non-emergent liver protocol imaging. IMPRESSION/RECOMMENDATIONS:    Cholecystitis with gallstone pancreatitis    The patient has symptomatic gallbladder disease for which I have recommended a laparoscopic cholecystectomy with cholangiogram.    The plan for surgery, risks, benefits, and alternatives have been reviewed with the patient. Will schedule for surgery in am.    Add PPI for duodenitis    Thank you.       Gianfranco Nino MD

## 2022-10-21 NOTE — H&P
HOSPITALISTS HISTORY AND PHYSICAL    10/21/2022 1:44 PM    Patient Information:  Emma Almodovar is a 45 y.o. female [id number]  PCP:  No primary care provider on file. (Tel: None )    Chief complaint:    Chief Complaint   Patient presents with    Abdominal Pain     Pt states abd pain on and off for 3 days, denies N/V/D        History of Present Illness:  Samantha Gale is a 45 y.o. female with history of obesity came to ER with complaints of RUQ abdominal pain for past 3 days. Sharp, radiating to back. Some nausea, no vomiting. No CP, SOB, HA or diarrhea. Chills, no fevers. No dysphagia or change in appetite. Some post prandial pain. No EtOH, smoking or illicits. Otherwise complete ROS is negative unless listed above. REVIEW OF SYSTEMS:   Pertinent positives as noted in HPI. All other systems were reviewed and are negative. Past Medical History:   has no past medical history on file. Past Surgical History:   has no past surgical history on file. Medications:  No current facility-administered medications on file prior to encounter. Current Outpatient Medications on File Prior to Encounter   Medication Sig Dispense Refill    naproxen (NAPROSYN) 500 MG tablet Take 1 tablet by mouth 2 times daily (with meals) for 30 doses. 30 tablet 0       Allergies:  No Known Allergies     Social History:  Patient Lives with family   reports that she has never smoked. She has never used smokeless tobacco. She reports current alcohol use. She reports that she does not use drugs. Family History:  family history is not on file. Physical Exam:  /85   Pulse 66   Temp 98 °F (36.7 °C) (Oral)   Resp 16   Ht 5' 3\" (1.6 m)   Wt 222 lb (100.7 kg)   LMP 06/25/2014   SpO2 97%   BMI 39.33 kg/m²     General appearance:  Appears comfortable.  Well nourished  Eyes: Sclera clear, pupils equal  ENT: Moist mucus membranes, no thrush. Trachea midline. Cardiovascular: Regular rhythm, normal S1, S2. No murmur, gallop, rub. No edema in lower extremities  Respiratory: Clear to auscultation bilaterally, no wheeze, good inspiratory effort  Gastrointestinal: Abdomen soft, RUQ tenderness, obese, not distended, normal bowel sounds  Musculoskeletal: No cyanosis in digits, neck supple  Neurology: Cranial nerves grossly intact. Alert and oriented in time, place and person. No speech or motor deficits  Psychiatry: Appropriate affect. Not agitated  Skin: Warm, dry, normal turgor, no rash  Brisk capillary refill, peripheral pulses palpable   Labs:  CBC:   Lab Results   Component Value Date/Time    WBC 8.1 10/20/2022 11:25 PM    RBC 4.65 10/20/2022 11:25 PM    HGB 13.3 10/20/2022 11:25 PM    HCT 41.3 10/20/2022 11:25 PM    MCV 88.8 10/20/2022 11:25 PM    MCH 28.7 10/20/2022 11:25 PM    MCHC 32.3 10/20/2022 11:25 PM    RDW 14.8 10/20/2022 11:25 PM     10/20/2022 11:25 PM    MPV 8.2 10/20/2022 11:25 PM     BMP:    Lab Results   Component Value Date/Time     10/20/2022 11:25 PM    K 4.2 10/20/2022 11:25 PM     10/20/2022 11:25 PM    CO2 27 10/20/2022 11:25 PM    BUN 9 10/20/2022 11:25 PM    CREATININE 0.7 10/20/2022 11:25 PM    CALCIUM 9.4 10/20/2022 11:25 PM    LABGLOM >60 10/20/2022 11:25 PM    GLUCOSE 102 10/20/2022 11:25 PM     CT ABDOMEN PELVIS W IV CONTRAST Additional Contrast? None   Final Result   1. Edematous appearance of the duodenum may represent duodenitis. 2. Cholelithiasis with presence of a 2.5 cm stone. No CT evidence of acute   cholecystitis. 3. Few scattered liver hypodensities measuring up to 1.3 cm of unclear   etiology. Can follow-up with non-emergent liver protocol imaging. XR CHEST (2 VW)   Final Result   No radiographic evidence of an acute cardiopulmonary process.              Problem List  Principal Problem:    Acute gallstone pancreatitis  Active Problems:    Symptomatic cholelithiasis    Elevated lipase    Gallstone pancreatitis  Resolved Problems:    * No resolved hospital problems. *        Assessment/Plan:   NPO  IVF  Morphine IV PRN pain  Hydralazine IV PRN   Surgery consult for symptomatic gallstones  Check lipase in AM      DVT prophylaxis Lovenox  Code status Full code  Diet NPO  IV access Peripheral   Jaeger Catheter No    Admit as inpatient. I anticipate hospitalization spanning more than two midnights for investigation and treatment of the above medically necessary diagnoses. Discussed with patient and Dr Roberto Ayers (Surgery). OR for lap misael with IOC tomorrow. Hopefully home on Sunday.       Henrry Rice MD    10/21/2022 1:44 PM

## 2022-10-22 ENCOUNTER — APPOINTMENT (OUTPATIENT)
Dept: GENERAL RADIOLOGY | Age: 52
DRG: 263 | End: 2022-10-22
Payer: COMMERCIAL

## 2022-10-22 ENCOUNTER — ANESTHESIA (OUTPATIENT)
Dept: OPERATING ROOM | Age: 52
DRG: 263 | End: 2022-10-22
Payer: COMMERCIAL

## 2022-10-22 LAB
A/G RATIO: 1.9 (ref 1.1–2.2)
ALBUMIN SERPL-MCNC: 3.3 G/DL (ref 3.4–5)
ALP BLD-CCNC: 47 U/L (ref 40–129)
ALT SERPL-CCNC: 12 U/L (ref 10–40)
ANION GAP SERPL CALCULATED.3IONS-SCNC: 10 MMOL/L (ref 3–16)
AST SERPL-CCNC: 14 U/L (ref 15–37)
BILIRUB SERPL-MCNC: 0.5 MG/DL (ref 0–1)
BUN BLDV-MCNC: 6 MG/DL (ref 7–20)
CALCIUM SERPL-MCNC: 8.9 MG/DL (ref 8.3–10.6)
CHLORIDE BLD-SCNC: 106 MMOL/L (ref 99–110)
CO2: 25 MMOL/L (ref 21–32)
CREAT SERPL-MCNC: 0.6 MG/DL (ref 0.6–1.1)
GFR SERPL CREATININE-BSD FRML MDRD: >60 ML/MIN/{1.73_M2}
GLUCOSE BLD-MCNC: 75 MG/DL (ref 70–99)
HCT VFR BLD CALC: 35.1 % (ref 36–48)
HEMOGLOBIN: 11.6 G/DL (ref 12–16)
LIPASE: 37 U/L (ref 13–60)
MCH RBC QN AUTO: 29.1 PG (ref 26–34)
MCHC RBC AUTO-ENTMCNC: 33 G/DL (ref 31–36)
MCV RBC AUTO: 88.2 FL (ref 80–100)
PDW BLD-RTO: 14.5 % (ref 12.4–15.4)
PLATELET # BLD: 200 K/UL (ref 135–450)
PMV BLD AUTO: 9.1 FL (ref 5–10.5)
POTASSIUM SERPL-SCNC: 3.5 MMOL/L (ref 3.5–5.1)
RBC # BLD: 3.98 M/UL (ref 4–5.2)
SODIUM BLD-SCNC: 141 MMOL/L (ref 136–145)
TOTAL PROTEIN: 5 G/DL (ref 6.4–8.2)
TRIGL SERPL-MCNC: 106 MG/DL (ref 0–150)
WBC # BLD: 6.5 K/UL (ref 4–11)

## 2022-10-22 PROCEDURE — 3600000004 HC SURGERY LEVEL 4 BASE: Performed by: SURGERY

## 2022-10-22 PROCEDURE — 2580000003 HC RX 258: Performed by: FAMILY MEDICINE

## 2022-10-22 PROCEDURE — 3600000014 HC SURGERY LEVEL 4 ADDTL 15MIN: Performed by: SURGERY

## 2022-10-22 PROCEDURE — 80053 COMPREHEN METABOLIC PANEL: CPT

## 2022-10-22 PROCEDURE — 83690 ASSAY OF LIPASE: CPT

## 2022-10-22 PROCEDURE — 2709999900 HC NON-CHARGEABLE SUPPLY: Performed by: SURGERY

## 2022-10-22 PROCEDURE — 2500000003 HC RX 250 WO HCPCS: Performed by: SURGERY

## 2022-10-22 PROCEDURE — 6370000000 HC RX 637 (ALT 250 FOR IP): Performed by: SURGERY

## 2022-10-22 PROCEDURE — 6360000002 HC RX W HCPCS: Performed by: FAMILY MEDICINE

## 2022-10-22 PROCEDURE — 3700000000 HC ANESTHESIA ATTENDED CARE: Performed by: SURGERY

## 2022-10-22 PROCEDURE — 85027 COMPLETE CBC AUTOMATED: CPT

## 2022-10-22 PROCEDURE — C9113 INJ PANTOPRAZOLE SODIUM, VIA: HCPCS | Performed by: SURGERY

## 2022-10-22 PROCEDURE — 88304 TISSUE EXAM BY PATHOLOGIST: CPT

## 2022-10-22 PROCEDURE — 3700000001 HC ADD 15 MINUTES (ANESTHESIA): Performed by: SURGERY

## 2022-10-22 PROCEDURE — 2580000003 HC RX 258: Performed by: SURGERY

## 2022-10-22 PROCEDURE — 7100000001 HC PACU RECOVERY - ADDTL 15 MIN: Performed by: SURGERY

## 2022-10-22 PROCEDURE — A4217 STERILE WATER/SALINE, 500 ML: HCPCS | Performed by: SURGERY

## 2022-10-22 PROCEDURE — 94760 N-INVAS EAR/PLS OXIMETRY 1: CPT

## 2022-10-22 PROCEDURE — BF131ZZ FLUOROSCOPY OF GALLBLADDER AND BILE DUCTS USING LOW OSMOLAR CONTRAST: ICD-10-PCS | Performed by: SURGERY

## 2022-10-22 PROCEDURE — 2500000003 HC RX 250 WO HCPCS: Performed by: FAMILY MEDICINE

## 2022-10-22 PROCEDURE — 6360000004 HC RX CONTRAST MEDICATION: Performed by: SURGERY

## 2022-10-22 PROCEDURE — 2720000010 HC SURG SUPPLY STERILE: Performed by: SURGERY

## 2022-10-22 PROCEDURE — 0FT44ZZ RESECTION OF GALLBLADDER, PERCUTANEOUS ENDOSCOPIC APPROACH: ICD-10-PCS | Performed by: SURGERY

## 2022-10-22 PROCEDURE — 6360000002 HC RX W HCPCS: Performed by: SURGERY

## 2022-10-22 PROCEDURE — 1200000000 HC SEMI PRIVATE

## 2022-10-22 PROCEDURE — 84478 ASSAY OF TRIGLYCERIDES: CPT

## 2022-10-22 PROCEDURE — 7100000000 HC PACU RECOVERY - FIRST 15 MIN: Performed by: SURGERY

## 2022-10-22 PROCEDURE — 74300 X-RAY BILE DUCTS/PANCREAS: CPT

## 2022-10-22 PROCEDURE — 47563 LAPARO CHOLECYSTECTOMY/GRAPH: CPT | Performed by: SURGERY

## 2022-10-22 PROCEDURE — 36415 COLL VENOUS BLD VENIPUNCTURE: CPT

## 2022-10-22 RX ORDER — PROCHLORPERAZINE EDISYLATE 5 MG/ML
5 INJECTION INTRAMUSCULAR; INTRAVENOUS
Status: DISCONTINUED | OUTPATIENT
Start: 2022-10-22 | End: 2022-10-22 | Stop reason: HOSPADM

## 2022-10-22 RX ORDER — OXYCODONE HYDROCHLORIDE AND ACETAMINOPHEN 5; 325 MG/1; MG/1
1 TABLET ORAL EVERY 4 HOURS PRN
Status: DISCONTINUED | OUTPATIENT
Start: 2022-10-22 | End: 2022-10-23 | Stop reason: HOSPADM

## 2022-10-22 RX ORDER — SODIUM CHLORIDE 0.9 % (FLUSH) 0.9 %
5-40 SYRINGE (ML) INJECTION EVERY 12 HOURS SCHEDULED
Status: DISCONTINUED | OUTPATIENT
Start: 2022-10-22 | End: 2022-10-22 | Stop reason: HOSPADM

## 2022-10-22 RX ORDER — SODIUM CHLORIDE 9 MG/ML
INJECTION, SOLUTION INTRAVENOUS CONTINUOUS
Status: DISCONTINUED | OUTPATIENT
Start: 2022-10-22 | End: 2022-10-23 | Stop reason: HOSPADM

## 2022-10-22 RX ORDER — ONDANSETRON 2 MG/ML
INJECTION INTRAMUSCULAR; INTRAVENOUS PRN
Status: DISCONTINUED | OUTPATIENT
Start: 2022-10-22 | End: 2022-10-22 | Stop reason: SDUPTHER

## 2022-10-22 RX ORDER — LIDOCAINE HYDROCHLORIDE 20 MG/ML
INJECTION, SOLUTION EPIDURAL; INFILTRATION; INTRACAUDAL; PERINEURAL PRN
Status: DISCONTINUED | OUTPATIENT
Start: 2022-10-22 | End: 2022-10-22 | Stop reason: SDUPTHER

## 2022-10-22 RX ORDER — LORAZEPAM 2 MG/ML
0.5 INJECTION INTRAMUSCULAR
Status: DISCONTINUED | OUTPATIENT
Start: 2022-10-22 | End: 2022-10-22 | Stop reason: HOSPADM

## 2022-10-22 RX ORDER — MEPERIDINE HYDROCHLORIDE 25 MG/ML
12.5 INJECTION INTRAMUSCULAR; INTRAVENOUS; SUBCUTANEOUS EVERY 5 MIN PRN
Status: DISCONTINUED | OUTPATIENT
Start: 2022-10-22 | End: 2022-10-22 | Stop reason: HOSPADM

## 2022-10-22 RX ORDER — PROPOFOL 10 MG/ML
INJECTION, EMULSION INTRAVENOUS PRN
Status: DISCONTINUED | OUTPATIENT
Start: 2022-10-22 | End: 2022-10-22 | Stop reason: SDUPTHER

## 2022-10-22 RX ORDER — METRONIDAZOLE 500 MG/100ML
500 INJECTION, SOLUTION INTRAVENOUS EVERY 8 HOURS
Status: COMPLETED | OUTPATIENT
Start: 2022-10-22 | End: 2022-10-23

## 2022-10-22 RX ORDER — DIPHENHYDRAMINE HYDROCHLORIDE 50 MG/ML
12.5 INJECTION INTRAMUSCULAR; INTRAVENOUS
Status: DISCONTINUED | OUTPATIENT
Start: 2022-10-22 | End: 2022-10-22 | Stop reason: HOSPADM

## 2022-10-22 RX ORDER — OXYCODONE HYDROCHLORIDE 5 MG/1
5 TABLET ORAL PRN
Status: DISCONTINUED | OUTPATIENT
Start: 2022-10-22 | End: 2022-10-22 | Stop reason: HOSPADM

## 2022-10-22 RX ORDER — FENTANYL CITRATE 50 UG/ML
INJECTION, SOLUTION INTRAMUSCULAR; INTRAVENOUS PRN
Status: DISCONTINUED | OUTPATIENT
Start: 2022-10-22 | End: 2022-10-22 | Stop reason: SDUPTHER

## 2022-10-22 RX ORDER — OXYCODONE HYDROCHLORIDE 5 MG/1
10 TABLET ORAL PRN
Status: DISCONTINUED | OUTPATIENT
Start: 2022-10-22 | End: 2022-10-22 | Stop reason: HOSPADM

## 2022-10-22 RX ORDER — SUCCINYLCHOLINE/SOD CL,ISO/PF 200MG/10ML
SYRINGE (ML) INTRAVENOUS PRN
Status: DISCONTINUED | OUTPATIENT
Start: 2022-10-22 | End: 2022-10-22 | Stop reason: SDUPTHER

## 2022-10-22 RX ORDER — ROCURONIUM BROMIDE 10 MG/ML
INJECTION, SOLUTION INTRAVENOUS PRN
Status: DISCONTINUED | OUTPATIENT
Start: 2022-10-22 | End: 2022-10-22 | Stop reason: SDUPTHER

## 2022-10-22 RX ORDER — SODIUM CHLORIDE 0.9 % (FLUSH) 0.9 %
5-40 SYRINGE (ML) INJECTION PRN
Status: DISCONTINUED | OUTPATIENT
Start: 2022-10-22 | End: 2022-10-22 | Stop reason: HOSPADM

## 2022-10-22 RX ORDER — ONDANSETRON 2 MG/ML
4 INJECTION INTRAMUSCULAR; INTRAVENOUS
Status: DISCONTINUED | OUTPATIENT
Start: 2022-10-22 | End: 2022-10-22 | Stop reason: HOSPADM

## 2022-10-22 RX ORDER — BUPIVACAINE HYDROCHLORIDE AND EPINEPHRINE 5; 5 MG/ML; UG/ML
INJECTION, SOLUTION EPIDURAL; INTRACAUDAL; PERINEURAL
Status: COMPLETED | OUTPATIENT
Start: 2022-10-22 | End: 2022-10-22

## 2022-10-22 RX ORDER — SODIUM CHLORIDE, SODIUM LACTATE, POTASSIUM CHLORIDE, AND CALCIUM CHLORIDE .6; .31; .03; .02 G/100ML; G/100ML; G/100ML; G/100ML
IRRIGANT IRRIGATION
Status: COMPLETED | OUTPATIENT
Start: 2022-10-22 | End: 2022-10-22

## 2022-10-22 RX ORDER — CEFAZOLIN SODIUM 1 G/3ML
INJECTION, POWDER, FOR SOLUTION INTRAMUSCULAR; INTRAVENOUS PRN
Status: DISCONTINUED | OUTPATIENT
Start: 2022-10-22 | End: 2022-10-22 | Stop reason: SDUPTHER

## 2022-10-22 RX ORDER — MAGNESIUM HYDROXIDE 1200 MG/15ML
LIQUID ORAL CONTINUOUS PRN
Status: COMPLETED | OUTPATIENT
Start: 2022-10-22 | End: 2022-10-22

## 2022-10-22 RX ORDER — FENTANYL CITRATE 50 UG/ML
25 INJECTION, SOLUTION INTRAMUSCULAR; INTRAVENOUS EVERY 5 MIN PRN
Status: DISCONTINUED | OUTPATIENT
Start: 2022-10-22 | End: 2022-10-22 | Stop reason: HOSPADM

## 2022-10-22 RX ORDER — LABETALOL HYDROCHLORIDE 5 MG/ML
10 INJECTION, SOLUTION INTRAVENOUS
Status: DISCONTINUED | OUTPATIENT
Start: 2022-10-22 | End: 2022-10-22 | Stop reason: HOSPADM

## 2022-10-22 RX ORDER — HYDROMORPHONE HCL 110MG/55ML
0.5 PATIENT CONTROLLED ANALGESIA SYRINGE INTRAVENOUS EVERY 5 MIN PRN
Status: DISCONTINUED | OUTPATIENT
Start: 2022-10-22 | End: 2022-10-22 | Stop reason: HOSPADM

## 2022-10-22 RX ORDER — DEXAMETHASONE SODIUM PHOSPHATE 4 MG/ML
INJECTION, SOLUTION INTRA-ARTICULAR; INTRALESIONAL; INTRAMUSCULAR; INTRAVENOUS; SOFT TISSUE PRN
Status: DISCONTINUED | OUTPATIENT
Start: 2022-10-22 | End: 2022-10-22 | Stop reason: SDUPTHER

## 2022-10-22 RX ORDER — SODIUM CHLORIDE 9 MG/ML
25 INJECTION, SOLUTION INTRAVENOUS PRN
Status: DISCONTINUED | OUTPATIENT
Start: 2022-10-22 | End: 2022-10-22 | Stop reason: HOSPADM

## 2022-10-22 RX ADMIN — ROCURONIUM BROMIDE 30 MG: 10 INJECTION, SOLUTION INTRAVENOUS at 08:22

## 2022-10-22 RX ADMIN — ACETAMINOPHEN 650 MG: 325 TABLET ORAL at 15:21

## 2022-10-22 RX ADMIN — DEXAMETHASONE SODIUM PHOSPHATE 4 MG: 4 INJECTION, SOLUTION INTRAMUSCULAR; INTRAVENOUS at 08:17

## 2022-10-22 RX ADMIN — SODIUM CHLORIDE: 9 INJECTION, SOLUTION INTRAVENOUS at 15:28

## 2022-10-22 RX ADMIN — METRONIDAZOLE 500 MG: 500 INJECTION, SOLUTION INTRAVENOUS at 16:54

## 2022-10-22 RX ADMIN — OXYCODONE AND ACETAMINOPHEN 1 TABLET: 5; 325 TABLET ORAL at 11:54

## 2022-10-22 RX ADMIN — OXYCODONE AND ACETAMINOPHEN 1 TABLET: 5; 325 TABLET ORAL at 22:34

## 2022-10-22 RX ADMIN — ONDANSETRON 4 MG: 2 INJECTION INTRAMUSCULAR; INTRAVENOUS at 08:17

## 2022-10-22 RX ADMIN — HYDROMORPHONE HYDROCHLORIDE 0.5 MG: 2 INJECTION, SOLUTION INTRAMUSCULAR; INTRAVENOUS; SUBCUTANEOUS at 09:56

## 2022-10-22 RX ADMIN — SODIUM CHLORIDE, PRESERVATIVE FREE 10 ML: 5 INJECTION INTRAVENOUS at 10:36

## 2022-10-22 RX ADMIN — CEFAZOLIN 2 G: 1 INJECTION, POWDER, FOR SOLUTION INTRAVENOUS at 08:20

## 2022-10-22 RX ADMIN — SODIUM CHLORIDE: 9 INJECTION, SOLUTION INTRAVENOUS at 10:31

## 2022-10-22 RX ADMIN — HYDROMORPHONE HYDROCHLORIDE 0.5 MG: 1 INJECTION, SOLUTION INTRAMUSCULAR; INTRAVENOUS; SUBCUTANEOUS at 15:21

## 2022-10-22 RX ADMIN — HYDROMORPHONE HYDROCHLORIDE 0.5 MG: 2 INJECTION, SOLUTION INTRAMUSCULAR; INTRAVENOUS; SUBCUTANEOUS at 10:03

## 2022-10-22 RX ADMIN — SUGAMMADEX 200 MG: 100 INJECTION, SOLUTION INTRAVENOUS at 09:19

## 2022-10-22 RX ADMIN — FENTANYL CITRATE 100 MCG: 50 INJECTION, SOLUTION INTRAMUSCULAR; INTRAVENOUS at 08:09

## 2022-10-22 RX ADMIN — Medication 100 MG: at 08:11

## 2022-10-22 RX ADMIN — CEFAZOLIN 2000 MG: 2 INJECTION, POWDER, FOR SOLUTION INTRAMUSCULAR; INTRAVENOUS at 15:22

## 2022-10-22 RX ADMIN — LIDOCAINE HYDROCHLORIDE 100 MG: 20 INJECTION, SOLUTION EPIDURAL; INFILTRATION; INTRACAUDAL; PERINEURAL at 08:09

## 2022-10-22 RX ADMIN — SODIUM CHLORIDE, PRESERVATIVE FREE 10 ML: 5 INJECTION INTRAVENOUS at 22:34

## 2022-10-22 RX ADMIN — PROPOFOL 150 MG: 10 INJECTION, EMULSION INTRAVENOUS at 08:10

## 2022-10-22 RX ADMIN — PANTOPRAZOLE SODIUM 40 MG: 40 INJECTION, POWDER, FOR SOLUTION INTRAVENOUS at 22:34

## 2022-10-22 ASSESSMENT — PAIN SCALES - GENERAL
PAINLEVEL_OUTOF10: 7
PAINLEVEL_OUTOF10: 7
PAINLEVEL_OUTOF10: 6
PAINLEVEL_OUTOF10: 6
PAINLEVEL_OUTOF10: 5
PAINLEVEL_OUTOF10: 5

## 2022-10-22 ASSESSMENT — PAIN DESCRIPTION - ORIENTATION
ORIENTATION: MID
ORIENTATION: LOWER
ORIENTATION: MID
ORIENTATION: LOWER

## 2022-10-22 ASSESSMENT — PAIN DESCRIPTION - DESCRIPTORS
DESCRIPTORS: ACHING

## 2022-10-22 ASSESSMENT — PAIN DESCRIPTION - LOCATION
LOCATION: ABDOMEN

## 2022-10-22 NOTE — DISCHARGE INSTRUCTIONS
Melvin Rowley M.D.    [phone] [address] [facility]      POST-OPERATIVE INSTRUCTIONS FOR GALLBLADDER SURGERY    Call the office to schedule your post-operative appointment with your surgeon for two (2) weeks. You will have surgical glue closing your incisions. Your incisions are waterproof. You may shower. Wash incisions gently, and pat them dry. Do not rub your incisions. General guidelines for activity:   Avoid strenuous activity or lifting anything heavier than 15 pounds. It is OK to be up  walking around, and walking up and down stairs. Do what is comfortable: stop and rest when you feel tired. Drink plenty of fluids and stay on a bland diet for 2-3 days after surgery. Do not drive for three days and while taking your narcotic pain medicine. Watch for signs of infection:  Excessive warmth or bright redness around your incisions  Leakage of bloody or cloudy fluid from you incisions  Fever over 100.5    During the laparoscopic procedure that you had, gas is pumped into the abdominal cavity. You may feel abdominal, shoulder, or rib pain for a few days due to this gas. You will have pain medicine ordered. Take as directed    If you experience constipation:  Increase your water intake  Increase your activity, walking is best.  A stool softener or mild laxative may be necessary if you still have not had a bowel movement ; call the office for further instructions.

## 2022-10-22 NOTE — PROGRESS NOTES
Pt admitted to [facility], awakening and following commands, abd lap sites x4 CD&I, O2 saturations stable on simple mask 8L, RR easy, pt states ' a little bit of pain\" but denies pain meds at this time. Vss, NSR on tele.  Will monitor

## 2022-10-22 NOTE — ANESTHESIA PRE PROCEDURE
Department of Anesthesiology  Preprocedure Note       Name:  Rolanda Riedel   Age:  45 y.o.  :  1970                                          MRN:  [id number]         Date:  10/22/2022      Surgeon: Divya Colin):  Gisella Abraham MD    Procedure: Procedure(s):  LAPAROSCOPIC CHOLECYSTECTOMY WITH CHOLANGIOGRAM    Medications prior to admission:   Prior to Admission medications    Medication Sig Start Date End Date Taking? Authorizing Provider   naproxen (NAPROSYN) 500 MG tablet Take 1 tablet by mouth 2 times daily (with meals) for 30 doses.  7/31/14 8/15/14  VITO Moran       Current medications:    Current Facility-Administered Medications   Medication Dose Route Frequency Provider Last Rate Last Admin    0.9 % sodium chloride bolus  1,000 mL IntraVENous Once Adithya Diaz MD        morphine injection 4 mg  4 mg IntraVENous Q30 Min PRN Adithya Diaz MD        sodium chloride flush 0.9 % injection 5-40 mL  5-40 mL IntraVENous 2 times per day Adithya Diaz MD   10 mL at 10/21/22 2019    sodium chloride flush 0.9 % injection 5-40 mL  5-40 mL IntraVENous PRN Adithya Diaz MD        0.9 % sodium chloride infusion   IntraVENous PRN Adithya Diaz MD        ondansetron (ZOFRAN-ODT) disintegrating tablet 4 mg  4 mg Oral Q8H PRN Adithya Diaz MD        Or    [facility]ron [facility]) injection 4 mg  4 mg IntraVENous Q6H PRN Adithya Diaz MD        enoxaparin (LOVENOX) injection 40 mg  40 mg SubCUTAneous Daily Adithya Diaz MD   40 mg at 10/21/22 0730    lactated ringers infusion   IntraVENous Continuous Adithya Diaz  mL/hr at 10/21/22 2021 New Bag at 10/21/22 2021    HYDROmorphone HCl PF (DILAUDID) injection 0.5 mg  0.5 mg IntraVENous Q4H PRN Adithya Diaz MD        pantoprazole (PROTONIX) injection 40 mg  40 mg IntraVENous BID Gisella Abraham MD   40 mg at 10/21/22 2019    acetaminophen (TYLENOL) tablet 650 mg  650 mg Oral Q4H PRN Mynor Damon MD   650 mg at 10/21/22 2019    hydrALAZINE (APRESOLINE) injection 10 mg  10 mg IntraVENous Q4H PRN Wild Dozier MD           Allergies:  No Known Allergies    Problem List:    Patient Active Problem List   Diagnosis Code    Acute gallstone pancreatitis K85.10    Symptomatic cholelithiasis K80.20    Elevated lipase R74.8    Gallstone pancreatitis K85.10       Past Medical History:  History reviewed. No pertinent past medical history. Past Surgical History:  History reviewed. No pertinent surgical history. Social History:    Social History     Tobacco Use    Smoking status: Never    Smokeless tobacco: Never   Substance Use Topics    Alcohol use: Yes                                Counseling given: Not Answered      Vital Signs (Current):   Vitals:    10/21/22 1729 10/21/22 1738 10/21/22 2000 10/21/22 2359   BP: 129/77  (!) 142/77 121/73   Pulse:  63 69 60   Resp: 14  16 16   Temp:       TempSrc:       SpO2:  96% 98% 96%   Weight:       Height:                                                  BP Readings from Last 3 Encounters:   10/21/22 121/73       NPO Status:                                                                                 BMI:   Wt Readings from Last 3 Encounters:   10/20/22 222 lb (100.7 kg)     Body mass index is 39.33 kg/m².     CBC:   Lab Results   Component Value Date/Time    WBC 8.1 10/20/2022 11:25 PM    RBC 4.65 10/20/2022 11:25 PM    HGB 13.3 10/20/2022 11:25 PM    HCT 41.3 10/20/2022 11:25 PM    MCV 88.8 10/20/2022 11:25 PM    RDW 14.8 10/20/2022 11:25 PM     10/20/2022 11:25 PM       CMP:   Lab Results   Component Value Date/Time     10/20/2022 11:25 PM    K 4.2 10/20/2022 11:25 PM     10/20/2022 11:25 PM    CO2 27 10/20/2022 11:25 PM    BUN 9 10/20/2022 11:25 PM    CREATININE 0.7 10/20/2022 11:25 PM    AGRATIO 1.8 10/20/2022 11:25 PM    LABGLOM >60 10/20/2022 11:25 PM    GLUCOSE 102 10/20/2022 11:25 PM    PROT 6.1 10/20/2022 11:25 PM    CALCIUM 9.4 10/20/2022 11:25 PM    BILITOT 0.3 Quality 431: Preventive Care And Screening: Unhealthy Alcohol Use - Screening: Patient screened for unhealthy alcohol use using a single question and scores less than 2 times per year Quality 402: Tobacco Use And Help With Quitting Among Adolescents: Patient screened for tobacco and never smoked Detail Level: Detailed Quality 226: Preventive Care And Screening: Tobacco Use: Screening And Cessation Intervention: Patient screened for tobacco use and is an ex/non-smoker Quality 130: Documentation Of Current Medications In The Medical Record: Current Medications Documented

## 2022-10-22 NOTE — PROGRESS NOTES
Pt awake and oriented, vss, remains on 2L NC after dilaudid IV. Abd lap sites CD&I, abd soft. Pain \" better\" able to drift off to sleep easily. NSR on tele. Phase 1 discharge criteria met.  Per anesthesia ok to transfer to floor

## 2022-10-22 NOTE — PROGRESS NOTES
[facility] PROGRESS NOTE    10/22/2022 12:05 PM        Name: Suki Emmanuel Admitted: 10/20/2022  Primary Care Provider: No primary care provider on file. (Tel: None)                        Subjective:  . No acute events overnight. Resting well. Pain control. Diet ok. Labs reviewed  Denies any chest pain sob. Reviewed interval ancillary notes    Current Medications  0.9 % sodium chloride infusion, Continuous  ceFAZolin (ANCEF) 2,000 mg in dextrose 5 % 50 mL IVPB (mini-bag), Q8H  metronidazole (FLAGYL) 500 mg in 0.9% NaCl 100 mL IVPB premix, Q8H  oxyCODONE-acetaminophen (PERCOCET) 5-325 MG per tablet 1 tablet, Q4H PRN  0.9 % sodium chloride bolus, Once  morphine injection 4 mg, Q30 Min PRN  sodium chloride flush 0.9 % injection 5-40 mL, 2 times per day  sodium chloride flush 0.9 % injection 5-40 mL, PRN  0.9 % sodium chloride infusion, PRN  ondansetron (ZOFRAN-ODT) disintegrating tablet 4 mg, Q8H PRN   Or  ondansetron (ZOFRAN) injection 4 mg, Q6H PRN  enoxaparin (LOVENOX) injection 40 mg, Daily  lactated ringers infusion, Continuous  HYDROmorphone HCl PF (DILAUDID) injection 0.5 mg, Q4H PRN  pantoprazole (PROTONIX) injection 40 mg, BID  acetaminophen (TYLENOL) tablet 650 mg, Q4H PRN  hydrALAZINE (APRESOLINE) injection 10 mg, Q4H PRN        Objective:  /84   Pulse 73   Temp 97.6 °F (36.4 °C)   Resp 15   Ht 5' 3\" (1.6 m)   Wt 222 lb (100.7 kg)   LMP 06/25/2014   SpO2 95%   BMI 39.33 kg/m²     Intake/Output Summary (Last 24 hours) at 10/22/2022 1205  Last data filed at 10/22/2022 1033  Gross per 24 hour   Intake 5565.37 ml   Output --   Net 5565.37 ml      Wt Readings from Last 3 Encounters:   10/20/22 222 lb (100.7 kg)       General appearance:  Appears comfortable  Eyes: Sclera clear. Pupils equal.  ENT: Moist oral mucosa.  Trachea midline, no adenopathy. Cardiovascular: Regular rhythm, normal S1, S2. No murmur. No edema in lower extremities  Respiratory: Not using accessory muscles. Good inspiratory effort. Clear to auscultation bilaterally, no wheeze or crackles. GI: Abdomen soft, no tenderness, not distended, normal bowel sounds  Musculoskeletal: No cyanosis in digits, neck supple  Neurology: CN 2-12 grossly intact. No speech or motor deficits  Psych: Normal affect. Alert and oriented in time, place and person  Skin: Warm, dry, normal turgor    Labs and Tests:  CBC:   Recent Labs     10/20/22  2325 10/22/22  0539   WBC 8.1 6.5   HGB 13.3 11.6*    200     BMP:    Recent Labs     10/20/22  2325 10/22/22  0539    141   K 4.2 3.5    106   CO2 27 25   BUN 9 6*   CREATININE 0.7 0.6   GLUCOSE 102* 75     Hepatic:   Recent Labs     10/20/22  2325 10/22/22  0539   AST 17 14*   ALT 16 12   BILITOT 0.3 0.5   ALKPHOS 40 43       Discussed care with patient             Problem List  Principal Problem:    Acute gallstone pancreatitis  Active Problems:    Symptomatic cholelithiasis    Elevated lipase    Gallstone pancreatitis  Resolved Problems:    * No resolved hospital problems. *       Assessment & Plan:   Acute cholecystitis  -Gallstone pancreatitis  -Plan for surgery today further recommendation to follow. We will continue IV fluid pain control. Diet: ADULT DIET;  Regular  Code:Full Code  DVT PPX jair Kim MD   10/22/2022 12:05 PM

## 2022-10-22 NOTE — OP NOTE
[place] [address]                     [facility], [address] [place] [address]                                OPERATIVE REPORT    PATIENT NAME: Yarelis Gaona                       :        1970  MED REC NO:   [id number]                          ROOM:       [id number]  ACCOUNT NO:   [de-identified]                           ADMIT DATE: 10/20/2022  PROVIDER:     Marni Walter MD    DATE OF PROCEDURE:  10/22/2022    PREOPERATIVE DIAGNOSES:  Symptomatic cholelithiasis with chronic  cholecystitis and gallstone pancreatitis. POSTOPERATIVE DIAGNOSES:  Symptomatic cholelithiasis with chronic  cholecystitis and gallstone pancreatitis. PROCEDURE  Laparoscopic cholecystectomy, intraoperative angiogram.    SURGEON:  Marni Walter MD    ANESTHESIA:  General plus local.    ESTIMATED BLOOD LOSS:  Minimal.    COMPLICATIONS:  None. INDICATIONS FOR SURGERY:  The patient is a 61-year-old female,  presenting with gallstone pancreatitis and symptomatic gallbladder  disease for laparoscopic cholecystectomy today. The patient has been  admitted, lipase returned to normal, LFTs are also normal at this time  and her pain is markedly improved from admission. Cholecystectomy is  planned for today. All questions answered. She consents to proceed. ADDITIONAL DETAILS OF SURGERY:  The patient was brought to the operating  room, placed on the operative table in supine position. Compression  boots were placed. General anesthetic was administered. The abdomen  was prepped and draped sterilely and time-out was done. A  supraumbilical 5-mm direct entry view port was placed and the abdominal  cavity was insufflated to 15 mmHg pressure. Epigastric 11-mm port and  two right lateral abdominal 5-mm ports were placed under direct vision. The patient's gallbladder was identified and grasped superior at the  fundus.   There was a multitude of adhesions present from the omentum and  duodenum to the anterior wall of the gallbladder and these were  gradually and progressively taken down, including a couple large vessel  which were clipped, clearing the area of the infundibulum. The  infundibulum area was grasped, retracted inferolaterally to the right. The triangle of Calot was then dissected. Critical angle view technique  was used to visualize cystic artery, cystic duct and node of Calot. The  cyst duct gallbladder junction was clipped and partially transected. The intraoperative catheter was then passed through its Angiocath in the  intra-abdominal wall and the cholangiogram was obtained. This appeared  normal.  The cholangiogram catheter was removed. The cystic duct was  closed with three clips proximally and transection of the duct was  completed. The patient's cystic artery was clipped with two clips  proximal, one clip distally and was transected. There were couple of  additional small vessels and posterior gallbladder wall vessel which  were also clipped with single clips as the gallbladder being removed off  the hepatic fossa. Bovie electrocautery was used for the majority of  the dissection at this point. The gallbladder was then removed out of  the epigastric 11-mm port site, suction aspirating and removing stones  to facilitate its removal.  The perihepatic area was irrigated and  aspirated dry. Gallbladder bed was hemostatic. The clips were in good  location with no bleeding or bile leak noted. Instruments and ports  were removed. The port sites appeared hemostatic. The fascia was  closed at the epigastric site with an 0 Vicryl figure-of-eight suture. Skin was closed at all sites with 4-0 Monocryl suture. Dermabond glue  was placed. The patient was taken to recovery room in stable condition  postop.       North Samaniego MD    D: 10/22/2022 9:41:06       T: 10/22/2022 9:44:48     [email or web address]  Job#: [id number]     Doc#: [id number]    CC:

## 2022-10-22 NOTE — PROGRESS NOTES
PM assessment complete, vitals stable, medications given per [facility], patient independent in the room.

## 2022-10-22 NOTE — ANESTHESIA POSTPROCEDURE EVALUATION
Department of Anesthesiology  Postprocedure Note    Patient: Luke Osborn  MRN: [id number]  YOB: 1970  Date of evaluation: 10/22/2022      Procedure Summary     Date: 10/22/22 Room / Location: [address]    Anesthesia Start: 9857 Anesthesia Stop: 1289    Procedure: LAPAROSCOPIC CHOLECYSTECTOMY WITH CHOLANGIOGRAM (Abdomen) Diagnosis:       Gallstone pancreatitis      (Gallstone pancreatitis [K85.10])    Surgeons: Poli Artis MD Responsible Provider: Sumanth Whaley MD    Anesthesia Type: general ASA Status: 2          Anesthesia Type: No value filed.     Faviola Phase I:      Faviola Phase II:        Anesthesia Post Evaluation    Patient location during evaluation: PACU  Level of consciousness: awake  Complications: no  Multimodal analgesia pain management approach

## 2022-10-22 NOTE — BRIEF OP NOTE
Brief Postoperative Note      Patient: Parker Boswell  YOB: 1970  MRN: [id number]    Date of Procedure: 10/22/2022    Pre-Op Diagnosis: Gallstone pancreatitis [K85.10]    Post-Op Diagnosis: Same       Procedure(s):  LAPAROSCOPIC CHOLECYSTECTOMY WITH CHOLANGIOGRAM    Surgeon(s):  Brittany Fairbanks MD    Assistant:  Surgical Assistant: Zaire Love    Anesthesia: General    Estimated Blood Loss (mL): Minimal    Complications: None    Specimens:   ID Type Source Tests Collected by Time Destination   A : gall bladder   Tissue Gallbladder SURGICAL PATHOLOGY Brittany Fairbanks MD 10/22/2022 0820        Implants:  * No implants in log *      Drains: * No LDAs found *    Findings: GB with stones, removed, IOC clear. Adv diet post op, likely DC home tomorrow.     Electronically signed by Brittany Fairbanks MD on 10/22/2022 at 9:24 AM

## 2022-10-23 VITALS
WEIGHT: 222 LBS | BODY MASS INDEX: 39.34 KG/M2 | OXYGEN SATURATION: 97 % | TEMPERATURE: 98 F | SYSTOLIC BLOOD PRESSURE: 155 MMHG | HEART RATE: 78 BPM | DIASTOLIC BLOOD PRESSURE: 88 MMHG | RESPIRATION RATE: 17 BRPM | HEIGHT: 63 IN

## 2022-10-23 PROCEDURE — 2580000003 HC RX 258: Performed by: SURGERY

## 2022-10-23 PROCEDURE — 6360000002 HC RX W HCPCS: Performed by: SURGERY

## 2022-10-23 PROCEDURE — 2500000003 HC RX 250 WO HCPCS: Performed by: SURGERY

## 2022-10-23 PROCEDURE — 99024 POSTOP FOLLOW-UP VISIT: CPT | Performed by: SURGERY

## 2022-10-23 PROCEDURE — C9113 INJ PANTOPRAZOLE SODIUM, VIA: HCPCS | Performed by: SURGERY

## 2022-10-23 RX ORDER — OXYCODONE HYDROCHLORIDE AND ACETAMINOPHEN 5; 325 MG/1; MG/1
1 TABLET ORAL EVERY 4 HOURS PRN
Qty: 15 TABLET | Refills: 0 | Status: SHIPPED | OUTPATIENT
Start: 2022-10-23 | End: 2022-10-26

## 2022-10-23 RX ADMIN — CEFAZOLIN 2000 MG: 2 INJECTION, POWDER, FOR SOLUTION INTRAMUSCULAR; INTRAVENOUS at 00:47

## 2022-10-23 RX ADMIN — ENOXAPARIN SODIUM 40 MG: 100 INJECTION SUBCUTANEOUS at 08:00

## 2022-10-23 RX ADMIN — SODIUM CHLORIDE, PRESERVATIVE FREE 10 ML: 5 INJECTION INTRAVENOUS at 08:01

## 2022-10-23 RX ADMIN — METRONIDAZOLE 500 MG: 500 INJECTION, SOLUTION INTRAVENOUS at 02:13

## 2022-10-23 RX ADMIN — PANTOPRAZOLE SODIUM 40 MG: 40 INJECTION, POWDER, FOR SOLUTION INTRAVENOUS at 08:00

## 2022-10-23 ASSESSMENT — PAIN DESCRIPTION - ORIENTATION
ORIENTATION: LOWER
ORIENTATION: LOWER

## 2022-10-23 ASSESSMENT — PAIN DESCRIPTION - LOCATION
LOCATION: ABDOMEN
LOCATION: ABDOMEN

## 2022-10-23 ASSESSMENT — PAIN DESCRIPTION - DESCRIPTORS
DESCRIPTORS: ACHING
DESCRIPTORS: ACHING

## 2022-10-23 NOTE — PLAN OF CARE
Problem: Discharge Planning  Goal: Discharge to home or other facility with appropriate resources  Outcome: Progressing     Problem: Pain  Goal: Verbalizes/displays adequate comfort level or baseline comfort level  10/23/2022 0730 by Kole Isaacs RN  Outcome: Progressing  10/23/2022 0500 by Ary Fallon RN  Outcome: Progressing     Problem: Safety - Adult  Goal: Free from fall injury  10/23/2022 0730 by Kole Isaacs RN  Outcome: Progressing  10/23/2022 0500 by Ary Fallon RN  Outcome: Progressing     Problem: Nutrition Deficit:  Goal: Optimize nutritional status  Outcome: Progressing Allergic reaction

## 2022-10-23 NOTE — PROGRESS NOTES
Order to discharge. Removed peripheral IV. Belongings checked and sent home with patient. Discharge instructions reviewed. All questions and queries answered. Patient stable at this time. Pt being transported to home by family. Pt walked to main lobby.

## 2022-10-23 NOTE — CARE COORDINATION
Patient discharged 10/23/2022 to home   All discharge needs met per case management     German Paz RN, BSN  [phone]

## 2022-10-23 NOTE — PROGRESS NOTES
[facility]    Surgery Progress Note           POD # 1    PATIENT NAME: Van Mullen     TODAY'S DATE: 10/23/2022    SUBJECTIVE:    Pt  in good spirits, no concerns. OBJECTIVE:   VITALS:  BP (!) 155/88   Pulse 78   Temp 98 °F (36.7 °C) (Oral)   Resp 17   Ht 5' 3\" (1.6 m)   Wt 222 lb (100.7 kg)   LMP 06/25/2014   SpO2 97%   BMI 39.33 kg/m²     INTAKE/OUTPUT:    I/O last 3 completed shifts: In: 5875.1 [P.O.:120; I.V.:5705; IV Piggyback:50.1]  Out: -   I/O this shift: In: 410 [P.O.:400; I.V.:10]  Out: 550 [Urine:550]              CONSTITUTIONAL:  awake and alert  LUNGS:  clear to auscultation  ABDOMEN:   normal bowel sounds, soft, non-distended, non-tender   INCISION: clean, dry, no drainage    Data:  CBC:   Recent Labs     10/20/22  2325 10/22/22  0539   WBC 8.1 6.5   HGB 13.3 11.6*   HCT 41.3 35.1*    200     BMP:    Recent Labs     10/20/22  2325 10/22/22  0539    141   K 4.2 3.5    106   CO2 27 25   BUN 9 6*   CREATININE 0.7 0.6   GLUCOSE 102* 75     Hepatic:   Recent Labs     10/20/22  2325 10/22/22  0539   AST 17 14*   ALT 16 12   BILITOT 0.3 0.5   ALKPHOS 57 47     Mag:    No results for input(s): MG in the last 72 hours. Phos:   No results for input(s): PHOS in the last 72 hours. INR: No results for input(s): INR in the last 72 hours.     Radiology Review:  IOC - normal    ASSESSMENT AND PLAN:  45 y.o. female status post LC, IOC    Doing well  Preop pain better  Abd benign, eating  Stable for discharge today  All Q answered     Keven Sauceda MD

## 2022-11-03 ENCOUNTER — OFFICE VISIT (OUTPATIENT)
Dept: SURGERY | Age: 52
End: 2022-11-03

## 2022-11-03 VITALS — SYSTOLIC BLOOD PRESSURE: 110 MMHG | BODY MASS INDEX: 38.79 KG/M2 | WEIGHT: 219 LBS | DIASTOLIC BLOOD PRESSURE: 66 MMHG

## 2022-11-03 DIAGNOSIS — K85.10 GALLSTONE PANCREATITIS: Primary | ICD-10-CM

## 2022-11-03 PROCEDURE — 99024 POSTOP FOLLOW-UP VISIT: CPT | Performed by: SURGERY

## 2022-11-03 NOTE — PROGRESS NOTES
[facility]                      PATIENT NAME: Parker Boswell     TODAY'S DATE: 11/3/2022    SUBJECTIVE:      Pt. returns to the office today following a laparoscopic cholecystectomy. She had surgery on 10/22 at [facility]. She has been recovering well to date, with progression towards normal activity and diet. She has no complaints today. Had cholecystitis and gallstone pancreatitis. OBJECTIVE:     VITALS:  Wt 219 lb (99.3 kg)   LMP 06/25/2014   BMI 38.79 kg/m²                                  CONSTITUTIONAL:  awake and alert  LUNGS:  clear to auscultation  ABDOMEN:   normal bowel sounds, soft, non-distended, non-tender   INCISIONS: clean, dry, no drainage      Data:    Radiology Review:  Intraoperative cholangiogram was normal.      ASSESSMENT AND PLAN:    45 y.o. female status post LC, IOC. Pathology shows chronic active cholecystitis and cholelithiasis. This was reviewed with the patient today. Her recovery is progressing uneventfully, and she is released to normal activity. She will call or return for any additional problems or questions.       Brittany Fairbanks MD

## 2022-11-04 NOTE — DISCHARGE SUMMARY
[facility] DISCHARGE SUMMARY    Patient Demographics    PatientCholo Patel  Date of Birth. 1970  MRN. [id number]     Primary care provider. No primary care provider on file. (Tel: None)    Admit date: 10/20/2022    Discharge date (blank if same as Note Date): 10/23/2022  Note Date: 11/4/2022     Reason for Hospitalization. Chief Complaint   Patient presents with    Abdominal Pain     Pt states abd pain on and off for 3 days, denies N/V/D           [facility] Course. Acute gallstone pancreatitis with  -= Patient with failed conservative management underwent laparoscopic cholecystectomy thereafter  -Postoperative day well. Pain improved. Patient was discharged stable    Consults. None    Physical examination on discharge day. BP (!) 155/88   Pulse 78   Temp 98 °F (36.7 °C) (Oral)   Resp 17   Ht 5' 3\" (1.6 m)   Wt 222 lb (100.7 kg)   LMP 06/25/2014   SpO2 97%   BMI 39.33 kg/m²   General appearance. Alert. Looks comfortable. HEENT. Sclera clear. Moist mucus membranes. Cardiovascular. Regular rate and rhythm, normal S1, S2. No murmur. Respiratory. Not using accessory muscles. Clear to auscultation bilaterally, no wheeze. Gastrointestinal. Abdomen soft, non-tender, not distended, normal bowel sounds  Neurology. Facial symmetry. No speech deficits. Moving all extremities equally. Extremities. No edema in lower extremities. Skin. Warm, dry, normal turgor    Condition at time of discharge stable     Medication instructions provided to patient at discharge. Medication List        STOP taking these medications      naproxen 500 MG tablet  Commonly known as: Naprosyn            ASK your doctor about these medications      oxyCODONE-acetaminophen 5-325 MG per tablet  Commonly known as: PERCOCET  Take 1 tablet by mouth every 4 hours as needed for Pain for up to 3 days.   Ask about: Should I take this medication? Where to Get Your Medications        You can get these medications from any pharmacy    Bring a paper prescription for each of these medications  oxyCODONE-acetaminophen 5-325 MG per tablet         Discharge recommendations given to patient. Follow Up. pcp in 1 week   Disposition. home  Activity. activity as tolerated  Diet: No diet orders on file      Spent 45  minutes in discharge process.     Signed:  Choco Leyva MD     11/4/2022 3:02 PM

## 2023-04-30 ENCOUNTER — APPOINTMENT (OUTPATIENT)
Dept: CT IMAGING | Age: 53
End: 2023-04-30
Payer: COMMERCIAL

## 2023-04-30 ENCOUNTER — HOSPITAL ENCOUNTER (EMERGENCY)
Age: 53
Discharge: HOME | End: 2023-04-30
Payer: COMMERCIAL

## 2023-04-30 VITALS
RESPIRATION RATE: 18 BRPM | DIASTOLIC BLOOD PRESSURE: 81 MMHG | OXYGEN SATURATION: 98 % | TEMPERATURE: 97.4 F | HEART RATE: 70 BPM | SYSTOLIC BLOOD PRESSURE: 143 MMHG

## 2023-04-30 DIAGNOSIS — R10.13 ABDOMINAL PAIN, EPIGASTRIC: Primary | ICD-10-CM

## 2023-04-30 LAB
ALBUMIN SERPL-MCNC: 4.2 G/DL (ref 3.4–5)
ALP SERPL-CCNC: 57 U/L (ref 40–129)
ALT SERPL-CCNC: 12 U/L (ref 10–40)
ANION GAP SERPL CALCULATED.3IONS-SCNC: 9 MMOL/L (ref 3–16)
AST SERPL-CCNC: 16 U/L (ref 15–37)
BASOPHILS # BLD: 0.1 K/UL (ref 0–0.2)
BASOPHILS NFR BLD: 0.8 %
BILIRUB DIRECT SERPL-MCNC: <0.2 MG/DL (ref 0–0.3)
BILIRUB INDIRECT SERPL-MCNC: NORMAL MG/DL (ref 0–1)
BILIRUB SERPL-MCNC: 0.3 MG/DL (ref 0–1)
BILIRUB UR QL STRIP.AUTO: NEGATIVE
BUN SERPL-MCNC: 11 MG/DL (ref 7–20)
CALCIUM SERPL-MCNC: 10 MG/DL (ref 8.3–10.6)
CHLORIDE SERPL-SCNC: 106 MMOL/L (ref 99–110)
CLARITY UR: CLEAR
CO2 SERPL-SCNC: 27 MMOL/L (ref 21–32)
COLOR UR: YELLOW
CREAT SERPL-MCNC: 0.6 MG/DL (ref 0.6–1.1)
DEPRECATED RDW RBC AUTO: 14.9 % (ref 12.4–15.4)
EOSINOPHIL # BLD: 0.5 K/UL (ref 0–0.6)
EOSINOPHIL NFR BLD: 5.5 %
GFR SERPLBLD CREATININE-BSD FMLA CKD-EPI: >60 ML/MIN/{1.73_M2}
GLUCOSE SERPL-MCNC: 95 MG/DL (ref 70–99)
GLUCOSE UR STRIP.AUTO-MCNC: NEGATIVE MG/DL
HCG SERPL QL: NEGATIVE
HCT VFR BLD AUTO: 43.1 % (ref 36–48)
HGB BLD-MCNC: 13.6 G/DL (ref 12–16)
HGB UR QL STRIP.AUTO: NEGATIVE
KETONES UR STRIP.AUTO-MCNC: NEGATIVE MG/DL
LEUKOCYTE ESTERASE UR QL STRIP.AUTO: NEGATIVE
LIPASE SERPL-CCNC: 38 U/L (ref 13–60)
LYMPHOCYTES # BLD: 2.5 K/UL (ref 1–5.1)
LYMPHOCYTES NFR BLD: 29.8 %
MCH RBC QN AUTO: 28.3 PG (ref 26–34)
MCHC RBC AUTO-ENTMCNC: 31.7 G/DL (ref 31–36)
MCV RBC AUTO: 89.4 FL (ref 80–100)
MONOCYTES # BLD: 0.7 K/UL (ref 0–1.3)
MONOCYTES NFR BLD: 7.7 %
NEUTROPHILS # BLD: 4.8 K/UL (ref 1.7–7.7)
NEUTROPHILS NFR BLD: 56.2 %
NITRITE UR QL STRIP.AUTO: NEGATIVE
PH UR STRIP.AUTO: 6.5 [PH] (ref 5–8)
PLATELET # BLD AUTO: 226 K/UL (ref 135–450)
PLATELET BLD QL SMEAR: ADEQUATE
PMV BLD AUTO: 8.8 FL (ref 5–10.5)
POTASSIUM SERPL-SCNC: 4.8 MMOL/L (ref 3.5–5.1)
PROT SERPL-MCNC: 6.5 G/DL (ref 6.4–8.2)
PROT UR STRIP.AUTO-MCNC: NEGATIVE MG/DL
RBC # BLD AUTO: 4.82 M/UL (ref 4–5.2)
REASON FOR REJECTION: NORMAL
REJECTED TEST: NORMAL
SLIDE REVIEW: NORMAL
SODIUM SERPL-SCNC: 142 MMOL/L (ref 136–145)
SP GR UR STRIP.AUTO: <=1.005 (ref 1–1.03)
UA COMPLETE W REFLEX CULTURE PNL UR: NORMAL
UA DIPSTICK W REFLEX MICRO PNL UR: NORMAL
URN SPEC COLLECT METH UR: NORMAL
UROBILINOGEN UR STRIP-ACNC: 0.2 E.U./DL
WBC # BLD AUTO: 8.5 K/UL (ref 4–11)

## 2023-04-30 PROCEDURE — 84703 CHORIONIC GONADOTROPIN ASSAY: CPT

## 2023-04-30 PROCEDURE — 80076 HEPATIC FUNCTION PANEL: CPT

## 2023-04-30 PROCEDURE — 85025 COMPLETE CBC W/AUTO DIFF WBC: CPT

## 2023-04-30 PROCEDURE — 83690 ASSAY OF LIPASE: CPT

## 2023-04-30 PROCEDURE — 36415 COLL VENOUS BLD VENIPUNCTURE: CPT

## 2023-04-30 PROCEDURE — 6360000004 HC RX CONTRAST MEDICATION: Performed by: PHYSICIAN ASSISTANT

## 2023-04-30 PROCEDURE — 80048 BASIC METABOLIC PNL TOTAL CA: CPT

## 2023-04-30 PROCEDURE — 81003 URINALYSIS AUTO W/O SCOPE: CPT

## 2023-04-30 PROCEDURE — 99285 EMERGENCY DEPT VISIT HI MDM: CPT

## 2023-04-30 PROCEDURE — 74177 CT ABD & PELVIS W/CONTRAST: CPT

## 2023-04-30 RX ORDER — FAMOTIDINE 20 MG/1
20 TABLET, FILM COATED ORAL 2 TIMES DAILY
Qty: 30 TABLET | Refills: 0 | Status: SHIPPED | OUTPATIENT
Start: 2023-04-30 | End: 2023-05-15

## 2023-04-30 RX ADMIN — IOPAMIDOL 75 ML: 755 INJECTION, SOLUTION INTRAVENOUS at 17:19

## 2023-04-30 ASSESSMENT — LIFESTYLE VARIABLES
HOW MANY STANDARD DRINKS CONTAINING ALCOHOL DO YOU HAVE ON A TYPICAL DAY: PATIENT DOES NOT DRINK
HOW OFTEN DO YOU HAVE A DRINK CONTAINING ALCOHOL: NEVER

## 2025-01-09 ENCOUNTER — HOSPITAL ENCOUNTER (EMERGENCY)
Age: 55
Discharge: HOME | End: 2025-01-09

## 2025-01-09 ENCOUNTER — APPOINTMENT (OUTPATIENT)
Dept: GENERAL RADIOLOGY | Age: 55
End: 2025-01-09

## 2025-01-09 VITALS
OXYGEN SATURATION: 93 % | WEIGHT: 220 LBS | HEIGHT: 63 IN | BODY MASS INDEX: 38.98 KG/M2 | SYSTOLIC BLOOD PRESSURE: 138 MMHG | DIASTOLIC BLOOD PRESSURE: 79 MMHG | TEMPERATURE: 98.4 F | HEART RATE: 83 BPM | RESPIRATION RATE: 18 BRPM

## 2025-01-09 DIAGNOSIS — J10.1 INFLUENZA A: Primary | ICD-10-CM

## 2025-01-09 LAB
FLUAV RNA RESP QL NAA+PROBE: DETECTED
FLUBV RNA RESP QL NAA+PROBE: NOT DETECTED
SARS-COV-2 RNA RESP QL NAA+PROBE: NOT DETECTED

## 2025-01-09 PROCEDURE — 71046 X-RAY EXAM CHEST 2 VIEWS: CPT

## 2025-01-09 PROCEDURE — 87636 SARSCOV2 & INF A&B AMP PRB: CPT

## 2025-01-09 PROCEDURE — 99284 EMERGENCY DEPT VISIT MOD MDM: CPT

## 2025-01-09 RX ORDER — BENZONATATE 100 MG/1
100 CAPSULE ORAL 3 TIMES DAILY PRN
Qty: 30 CAPSULE | Refills: 0 | Status: SHIPPED | OUTPATIENT
Start: 2025-01-09 | End: 2025-01-16

## 2025-01-09 ASSESSMENT — LIFESTYLE VARIABLES
HOW OFTEN DO YOU HAVE A DRINK CONTAINING ALCOHOL: NEVER
HOW MANY STANDARD DRINKS CONTAINING ALCOHOL DO YOU HAVE ON A TYPICAL DAY: PATIENT DOES NOT DRINK

## 2025-01-09 ASSESSMENT — PAIN DESCRIPTION - LOCATION: LOCATION: HEAD

## 2025-01-09 ASSESSMENT — PAIN SCALES - GENERAL: PAINLEVEL_OUTOF10: 5

## 2025-01-09 ASSESSMENT — PAIN - FUNCTIONAL ASSESSMENT: PAIN_FUNCTIONAL_ASSESSMENT: 0-10

## 2025-01-10 ASSESSMENT — ENCOUNTER SYMPTOMS
DIARRHEA: 0
VOMITING: 0
COUGH: 1
ABDOMINAL PAIN: 0
NAUSEA: 0
RHINORRHEA: 0
SHORTNESS OF BREATH: 0

## 2025-01-10 NOTE — ED PROVIDER NOTES
93%   Weight: 99.8 kg (220 lb)   Height: 1.6 m (5' 3\")       Patient was given the following medications:  Medications - No data to display              Is this patient to be included in the SEP-1 Core Measure due to severe sepsis or septic shock?   No   Exclusion criteria - the patient is NOT to be included for SEP-1 Core Measure due to:  Viral etiology found or highly suspected (including COVID-19) without concomitant bacterial infection    Chronic Conditions affecting care:  has no past medical history on file.    CONSULTS: (Who and What was discussed)  None      Social Determinants Significantly Affecting Health : None    Records Reviewed (External and Source) previous primary care office visit    CC/HPI Summary, DDx, ED Course, and Reassessment: Briefly, this is a 54-year-old female who presents to the emergency department today for evaluation for concerns of cough and congestion.  Patient reports that her symptoms initially began 1 week ago.  She reports that over the past 2 days she has had generalized bodyaches as well as low-grade fevers.  Multiple members at home are sick with similar symptoms    .  On examination, her lungs are clear, her vital signs are stable.    Disposition Considerations (tests considered but not done, Admit vs D/C, Shared Decision Making, Pt Expectation of Test or Tx.):    Patient is positive for influenza A, her x-ray is negative for any pneumonia.    Patient's vital signs are stable here, I did consider doing lab work, but this would otherwise not .  Her symptoms began over 1 week ago she is outside the window for Tamiflu.  Therefore supportive care discussed at home.  Will also prescribe Tessalon Perles.  She has routine follow-up with her primary care physician within 2 to 3 days.  She is to return to the ED for new or worsening symptoms, stable for discharge.  Suspicion is low at this time for pneumonia, pleural effusion, pneumothorax, here for distress,

## (undated) DEVICE — ADHESIVE SKIN CLSR 0.7ML TOP DERMBND ADV

## (undated) DEVICE — LAPAROSCOPY PACK: Brand: MEDLINE INDUSTRIES, INC.

## (undated) DEVICE — LAPAROSCOPIC TROCAR SLEEVE/SINGLE USE: Brand: KII® LOW PROFILE OPTICAL ACCESS SYSTEM

## (undated) DEVICE — GOWN SIRUS NONREIN LG W/TWL: Brand: MEDLINE INDUSTRIES, INC.

## (undated) DEVICE — C-ARM: Brand: UNBRANDED

## (undated) DEVICE — ELECTRODE PT RET AD L9FT HI MOIST COND ADH HYDRGEL CORDED

## (undated) DEVICE — GLOVE SURG SZ 6.5 L11.2IN FNGR THK9.8MIL STRW LTX POLYMER

## (undated) DEVICE — MERCY FAIRFIELD TURNOVER KIT: Brand: MEDLINE INDUSTRIES, INC.

## (undated) DEVICE — SYRINGE MED 10ML TRNSLUC BRL PLUNG BLK MRK POLYPR CTRL

## (undated) DEVICE — APPLIER CLP M/L SHFT DIA5MM 15 LIG LIGAMAX 5

## (undated) DEVICE — Device

## (undated) DEVICE — HYPODERMIC SAFETY NEEDLE: Brand: MAGELLAN

## (undated) DEVICE — COVER LT HNDL BLU PLAS

## (undated) DEVICE — SUTURE MCRYL + SZ 4-0 L27IN ABSRB UD L19MM PS-2 3/8 CIR MCP426H

## (undated) DEVICE — CORD ES L10FT MPLR LAP

## (undated) DEVICE — TROCAR SLEEVE: Brand: KII ® LOW PROFILE SLEEVE

## (undated) DEVICE — SHEET,DRAPE,53X77,STERILE: Brand: MEDLINE

## (undated) DEVICE — INDICATED FOR USE DURING OPEN AND LAPAROSCOPIC CHOLECYSTECTOMY PROCEDURES TO INJECT RADIOPAQUE MEDIA THROUGH THE CYSTIC DUCT INTO THE BILIARY TREE.: Brand: AEROSTAT®

## (undated) DEVICE — APPLICATOR MEDICATED 26 CC SOLUTION HI LT ORNG CHLORAPREP

## (undated) DEVICE — DRAPE,LAP,CHOLE,W/TROUGHS,STERILE: Brand: MEDLINE

## (undated) DEVICE — SOLUTION IRRIG 1000ML 0.9% SOD CHL USP POUR PLAS BTL

## (undated) DEVICE — SYRINGE MED 30ML STD CLR PLAS LUERLOCK TIP N CTRL DISP

## (undated) DEVICE — TROCAR: Brand: KII FIOS FIRST ENTRY